# Patient Record
Sex: MALE | Race: BLACK OR AFRICAN AMERICAN | NOT HISPANIC OR LATINO | Employment: UNEMPLOYED | URBAN - METROPOLITAN AREA
[De-identification: names, ages, dates, MRNs, and addresses within clinical notes are randomized per-mention and may not be internally consistent; named-entity substitution may affect disease eponyms.]

---

## 2023-02-22 LAB
CALCIDIOL (25 OH VITAMIN D3) (NG/ML) IN SER/PLAS: 14 NG/ML
CHOLESTEROL (MG/DL) IN SER/PLAS: 247 MG/DL (ref 0–199)
CHOLESTEROL IN HDL (MG/DL) IN SER/PLAS: 53.3 MG/DL
CHOLESTEROL/HDL RATIO: 4.6
LDL: 174 MG/DL (ref 0–99)
THYROTROPIN (MIU/L) IN SER/PLAS BY DETECTION LIMIT <= 0.05 MIU/L: 0.43 MIU/L (ref 0.44–3.98)
THYROXINE (T4) FREE (NG/DL) IN SER/PLAS: 1.14 NG/DL (ref 0.78–1.48)
TRIGLYCERIDE (MG/DL) IN SER/PLAS: 98 MG/DL (ref 0–149)
VLDL: 20 MG/DL (ref 0–40)

## 2023-03-14 DIAGNOSIS — E05.90 SUBCLINICAL HYPERTHYROIDISM: Primary | ICD-10-CM

## 2023-03-14 LAB
THYROTROPIN (MIU/L) IN SER/PLAS BY DETECTION LIMIT <= 0.05 MIU/L: 0.34 MIU/L (ref 0.44–3.98)
THYROXINE (T4) FREE (NG/DL) IN SER/PLAS: 1.17 NG/DL (ref 0.78–1.48)

## 2023-03-14 NOTE — RESULT ENCOUNTER NOTE
Continuing to show subclinical hyperthyroidism.  I am referring to endocrinology for further evaluation and treatment.

## 2023-03-15 ENCOUNTER — TELEPHONE (OUTPATIENT)
Dept: PRIMARY CARE | Facility: CLINIC | Age: 68
End: 2023-03-15
Payer: COMMERCIAL

## 2023-03-15 NOTE — TELEPHONE ENCOUNTER
----- Message from Christopher D'Amico, DO sent at 3/10/2023  8:46 AM EST -----  CT chest shows stable nodules that are very small, considered to be likely benign.  It is recommended that he continues with annual screening with chest CT.    Findings of diffuse idiopathic skeletal hyperostosis (DISH).  This is hardening of the ligaments around the spine.  Can cause pain and stiffness.  Treatments include physical therapy, and possibly surgery based on the symptoms.  If asymptomatic, we will continue to monitor, if feeling stiff, we start with physical therapy.  And if significant pain, we can refer to spinal surgery.

## 2023-04-07 ENCOUNTER — HOSPITAL ENCOUNTER (OUTPATIENT)
Dept: DATA CONVERSION | Facility: HOSPITAL | Age: 68
End: 2023-04-07
Attending: PAIN MEDICINE | Admitting: PAIN MEDICINE
Payer: COMMERCIAL

## 2023-04-07 DIAGNOSIS — M54.16 RADICULOPATHY, LUMBAR REGION: ICD-10-CM

## 2023-04-07 DIAGNOSIS — Z91.041 RADIOGRAPHIC DYE ALLERGY STATUS: ICD-10-CM

## 2023-04-10 ENCOUNTER — APPOINTMENT (OUTPATIENT)
Dept: PRIMARY CARE | Facility: CLINIC | Age: 68
End: 2023-04-10
Payer: COMMERCIAL

## 2023-04-13 ENCOUNTER — APPOINTMENT (OUTPATIENT)
Dept: PRIMARY CARE | Facility: CLINIC | Age: 68
End: 2023-04-13
Payer: COMMERCIAL

## 2023-05-12 ENCOUNTER — OFFICE VISIT (OUTPATIENT)
Dept: PRIMARY CARE | Facility: CLINIC | Age: 68
End: 2023-05-12
Payer: COMMERCIAL

## 2023-05-12 ENCOUNTER — LAB (OUTPATIENT)
Dept: LAB | Facility: LAB | Age: 68
End: 2023-05-12
Payer: COMMERCIAL

## 2023-05-12 VITALS
HEART RATE: 85 BPM | WEIGHT: 261 LBS | DIASTOLIC BLOOD PRESSURE: 72 MMHG | TEMPERATURE: 98.6 F | OXYGEN SATURATION: 96 % | HEIGHT: 67 IN | BODY MASS INDEX: 40.97 KG/M2 | SYSTOLIC BLOOD PRESSURE: 124 MMHG

## 2023-05-12 DIAGNOSIS — M10.9 GOUT, UNSPECIFIED CAUSE, UNSPECIFIED CHRONICITY, UNSPECIFIED SITE: ICD-10-CM

## 2023-05-12 DIAGNOSIS — I86.1 VARICOCELE PRESENT ON ULTRASOUND OF SCROTUM: ICD-10-CM

## 2023-05-12 DIAGNOSIS — C61 PROSTATE CANCER (MULTI): ICD-10-CM

## 2023-05-12 DIAGNOSIS — I10 HYPERTENSION, UNSPECIFIED TYPE: ICD-10-CM

## 2023-05-12 DIAGNOSIS — E55.9 VITAMIN D DEFICIENCY: ICD-10-CM

## 2023-05-12 DIAGNOSIS — E78.5 HYPERLIPIDEMIA, UNSPECIFIED HYPERLIPIDEMIA TYPE: ICD-10-CM

## 2023-05-12 DIAGNOSIS — R79.89 ELEVATED TSH: ICD-10-CM

## 2023-05-12 DIAGNOSIS — I10 HYPERTENSION, UNSPECIFIED TYPE: Primary | ICD-10-CM

## 2023-05-12 PROBLEM — R29.898 LEFT LEG WEAKNESS: Status: ACTIVE | Noted: 2023-05-12

## 2023-05-12 PROBLEM — G89.29 CHRONIC LOW BACK PAIN WITHOUT SCIATICA: Status: ACTIVE | Noted: 2023-05-12

## 2023-05-12 PROBLEM — Z85.528 HISTORY OF RENAL CELL CARCINOMA: Status: ACTIVE | Noted: 2023-05-12

## 2023-05-12 PROBLEM — M54.16 LUMBAR RADICULOPATHY: Status: ACTIVE | Noted: 2023-05-12

## 2023-05-12 PROBLEM — R29.2 HYPER REFLEXIA: Status: ACTIVE | Noted: 2023-05-12

## 2023-05-12 PROBLEM — N39.41 URGE INCONTINENCE OF URINE: Status: ACTIVE | Noted: 2023-05-12

## 2023-05-12 PROBLEM — M54.50 CHRONIC LOW BACK PAIN WITHOUT SCIATICA: Status: ACTIVE | Noted: 2023-05-12

## 2023-05-12 PROBLEM — M25.552 LEFT HIP PAIN: Status: ACTIVE | Noted: 2023-05-12

## 2023-05-12 PROBLEM — G47.33 OBSTRUCTIVE SLEEP APNEA OF ADULT: Status: ACTIVE | Noted: 2023-05-12

## 2023-05-12 PROBLEM — R94.31 ABNORMAL EKG: Status: ACTIVE | Noted: 2023-05-12

## 2023-05-12 PROBLEM — M67.431 GANGLION OF RIGHT WRIST: Status: ACTIVE | Noted: 2023-05-12

## 2023-05-12 PROBLEM — R35.0 INCREASED FREQUENCY OF URINATION: Status: ACTIVE | Noted: 2023-05-12

## 2023-05-12 PROBLEM — N45.1 EPIDIDYMITIS: Status: ACTIVE | Noted: 2023-05-12

## 2023-05-12 PROBLEM — Z97.3 WEARS READING EYEGLASSES: Status: ACTIVE | Noted: 2023-05-12

## 2023-05-12 PROBLEM — M25.562 LEFT KNEE PAIN: Status: ACTIVE | Noted: 2023-05-12

## 2023-05-12 PROBLEM — Z90.79 S/P PROSTATECTOMY: Status: ACTIVE | Noted: 2023-05-12

## 2023-05-12 PROBLEM — K63.5 COLON POLYP: Status: ACTIVE | Noted: 2023-05-12

## 2023-05-12 PROBLEM — H40.003 GLAUCOMA SUSPECT OF BOTH EYES: Status: ACTIVE | Noted: 2023-05-12

## 2023-05-12 PROBLEM — M25.561 ARTHRALGIA OF RIGHT KNEE: Status: ACTIVE | Noted: 2023-05-12

## 2023-05-12 PROBLEM — N52.9 MALE ERECTILE DISORDER OF ORGANIC ORIGIN: Status: ACTIVE | Noted: 2023-05-12

## 2023-05-12 PROBLEM — S83.241A TEAR OF MEDIAL MENISCUS OF RIGHT KNEE: Status: ACTIVE | Noted: 2023-05-12

## 2023-05-12 PROBLEM — N50.819 PERSISTENT TESTICULAR PAIN: Status: ACTIVE | Noted: 2023-05-12

## 2023-05-12 PROBLEM — N50.89 PAIN OF MALE GENITALIA: Status: ACTIVE | Noted: 2023-05-12

## 2023-05-12 PROBLEM — R97.20 ELEVATED PSA: Status: ACTIVE | Noted: 2023-05-12

## 2023-05-12 PROBLEM — N52.31 ERECTILE DYSFUNCTION FOLLOWING RADICAL PROSTATECTOMY: Status: ACTIVE | Noted: 2023-05-12

## 2023-05-12 PROBLEM — H26.9 CATARACT: Status: ACTIVE | Noted: 2023-05-12

## 2023-05-12 PROBLEM — K86.2 CYST OF PANCREAS (HHS-HCC): Status: ACTIVE | Noted: 2023-05-12

## 2023-05-12 PROBLEM — N45.3 ORCHITIS AND EPIDIDYMITIS: Status: ACTIVE | Noted: 2023-05-12

## 2023-05-12 PROBLEM — M48.062 SPINAL STENOSIS, LUMBAR REGION, WITH NEUROGENIC CLAUDICATION: Status: ACTIVE | Noted: 2023-05-12

## 2023-05-12 LAB
ALANINE AMINOTRANSFERASE (SGPT) (U/L) IN SER/PLAS: 21 U/L (ref 10–52)
ALBUMIN (G/DL) IN SER/PLAS: 4.3 G/DL (ref 3.4–5)
ALKALINE PHOSPHATASE (U/L) IN SER/PLAS: 86 U/L (ref 33–136)
ANION GAP IN SER/PLAS: 12 MMOL/L (ref 10–20)
ASPARTATE AMINOTRANSFERASE (SGOT) (U/L) IN SER/PLAS: 16 U/L (ref 9–39)
BILIRUBIN TOTAL (MG/DL) IN SER/PLAS: 1 MG/DL (ref 0–1.2)
CALCIDIOL (25 OH VITAMIN D3) (NG/ML) IN SER/PLAS: 37 NG/ML
CALCIUM (MG/DL) IN SER/PLAS: 9.6 MG/DL (ref 8.6–10.6)
CARBON DIOXIDE, TOTAL (MMOL/L) IN SER/PLAS: 28 MMOL/L (ref 21–32)
CHLORIDE (MMOL/L) IN SER/PLAS: 105 MMOL/L (ref 98–107)
CREATININE (MG/DL) IN SER/PLAS: 1.46 MG/DL (ref 0.5–1.3)
GFR MALE: 52 ML/MIN/1.73M2
GLUCOSE (MG/DL) IN SER/PLAS: 82 MG/DL (ref 74–99)
POTASSIUM (MMOL/L) IN SER/PLAS: 4.2 MMOL/L (ref 3.5–5.3)
PROTEIN TOTAL: 6.6 G/DL (ref 6.4–8.2)
SODIUM (MMOL/L) IN SER/PLAS: 141 MMOL/L (ref 136–145)
THYROTROPIN (MIU/L) IN SER/PLAS BY DETECTION LIMIT <= 0.05 MIU/L: 0.77 MIU/L (ref 0.44–3.98)
URATE (MG/DL) IN SER/PLAS: 8.4 MG/DL (ref 4–7.5)
UREA NITROGEN (MG/DL) IN SER/PLAS: 18 MG/DL (ref 6–23)

## 2023-05-12 PROCEDURE — 84443 ASSAY THYROID STIM HORMONE: CPT

## 2023-05-12 PROCEDURE — 1159F MED LIST DOCD IN RCRD: CPT | Performed by: STUDENT IN AN ORGANIZED HEALTH CARE EDUCATION/TRAINING PROGRAM

## 2023-05-12 PROCEDURE — 82306 VITAMIN D 25 HYDROXY: CPT

## 2023-05-12 PROCEDURE — 3078F DIAST BP <80 MM HG: CPT | Performed by: STUDENT IN AN ORGANIZED HEALTH CARE EDUCATION/TRAINING PROGRAM

## 2023-05-12 PROCEDURE — 1160F RVW MEDS BY RX/DR IN RCRD: CPT | Performed by: STUDENT IN AN ORGANIZED HEALTH CARE EDUCATION/TRAINING PROGRAM

## 2023-05-12 PROCEDURE — 80053 COMPREHEN METABOLIC PANEL: CPT

## 2023-05-12 PROCEDURE — 99214 OFFICE O/P EST MOD 30 MIN: CPT | Performed by: STUDENT IN AN ORGANIZED HEALTH CARE EDUCATION/TRAINING PROGRAM

## 2023-05-12 PROCEDURE — 84550 ASSAY OF BLOOD/URIC ACID: CPT

## 2023-05-12 PROCEDURE — 3074F SYST BP LT 130 MM HG: CPT | Performed by: STUDENT IN AN ORGANIZED HEALTH CARE EDUCATION/TRAINING PROGRAM

## 2023-05-12 PROCEDURE — 36415 COLL VENOUS BLD VENIPUNCTURE: CPT

## 2023-05-12 RX ORDER — COLCHICINE 0.6 MG/1
1 TABLET ORAL 2 TIMES DAILY
COMMUNITY
Start: 2023-02-24 | End: 2023-08-22 | Stop reason: ALTCHOICE

## 2023-05-12 RX ORDER — PREDNISONE 20 MG/1
1 TABLET ORAL DAILY
COMMUNITY
Start: 2023-02-23 | End: 2023-10-11 | Stop reason: WASHOUT

## 2023-05-12 RX ORDER — MIRABEGRON 50 MG/1
1 TABLET, FILM COATED, EXTENDED RELEASE ORAL DAILY
COMMUNITY
Start: 2023-02-24 | End: 2023-05-31 | Stop reason: SDUPTHER

## 2023-05-12 RX ORDER — ACETAMINOPHEN AND CODEINE PHOSPHATE 300; 30 MG/1; MG/1
1 TABLET ORAL EVERY 8 HOURS PRN
COMMUNITY
Start: 2023-02-22 | End: 2023-08-22 | Stop reason: ALTCHOICE

## 2023-05-12 RX ORDER — SIMETHICONE 80 MG
TABLET,CHEWABLE ORAL
COMMUNITY
Start: 2021-01-27 | End: 2023-08-22 | Stop reason: ALTCHOICE

## 2023-05-12 RX ORDER — INDOMETHACIN 50 MG/1
CAPSULE ORAL
COMMUNITY
Start: 2023-02-23 | End: 2023-05-12 | Stop reason: SDUPTHER

## 2023-05-12 RX ORDER — ASPIRIN 81 MG/1
1 TABLET ORAL DAILY
COMMUNITY

## 2023-05-12 RX ORDER — ASPIRIN 325 MG
50000 TABLET, DELAYED RELEASE (ENTERIC COATED) ORAL
COMMUNITY
Start: 2023-02-24 | End: 2023-08-22 | Stop reason: SDUPTHER

## 2023-05-12 RX ORDER — METHOCARBAMOL 500 MG/1
TABLET, FILM COATED ORAL 2 TIMES DAILY
COMMUNITY
Start: 2020-09-21 | End: 2023-08-22 | Stop reason: ALTCHOICE

## 2023-05-12 RX ORDER — INDOMETHACIN 50 MG/1
CAPSULE ORAL
Qty: 30 CAPSULE | Refills: 2 | Status: SHIPPED | OUTPATIENT
Start: 2023-05-12 | End: 2023-08-22 | Stop reason: ALTCHOICE

## 2023-05-12 RX ORDER — ATORVASTATIN CALCIUM 40 MG/1
40 TABLET, FILM COATED ORAL DAILY
COMMUNITY
End: 2024-04-08 | Stop reason: SDUPTHER

## 2023-05-12 RX ORDER — NAPROXEN 500 MG/1
1 TABLET ORAL 2 TIMES DAILY
COMMUNITY
Start: 2023-01-06 | End: 2023-05-12 | Stop reason: ALTCHOICE

## 2023-05-12 ASSESSMENT — PATIENT HEALTH QUESTIONNAIRE - PHQ9
1. LITTLE INTEREST OR PLEASURE IN DOING THINGS: NOT AT ALL
SUM OF ALL RESPONSES TO PHQ9 QUESTIONS 1 AND 2: 0
2. FEELING DOWN, DEPRESSED OR HOPELESS: NOT AT ALL

## 2023-05-12 ASSESSMENT — COLUMBIA-SUICIDE SEVERITY RATING SCALE - C-SSRS
2. HAVE YOU ACTUALLY HAD ANY THOUGHTS OF KILLING YOURSELF?: NO
1. IN THE PAST MONTH, HAVE YOU WISHED YOU WERE DEAD OR WISHED YOU COULD GO TO SLEEP AND NOT WAKE UP?: NO
6. HAVE YOU EVER DONE ANYTHING, STARTED TO DO ANYTHING, OR PREPARED TO DO ANYTHING TO END YOUR LIFE?: NO

## 2023-05-12 ASSESSMENT — ENCOUNTER SYMPTOMS
OCCASIONAL FEELINGS OF UNSTEADINESS: 0
LOSS OF SENSATION IN FEET: 0

## 2023-05-12 NOTE — PROGRESS NOTES
67-year-old male presenting for follow-up of multiple concerns:    HTN  Stable, not taking any medications currently.    HLD  Stable, tolerating current regimen well.    Prostate cancer, varicocele  Was seeing urology, discussed varicocele treatments.  Patient not sure what he wants to do.    Vitamin D deficiency  Has been taking the 50,000 units weekly, but missed read and has been taking them daily.    Back pain  Improved from last visit, has established with pain management.    Gout  Having active flare, needs refills on indomethacin.    12 point ROS reviewed and negative other than as stated in HPI    General: Alert, oriented, pleasant, in no acute distress  HEENT:      Head: normocephalic, atraumatic;      eyes: EOMI, no scleral icterus;   CV: Heart with regular rate and rhythm, normal S1/S2, no murmurs  Lungs: CTAB without wheezing, rhonchi or rales; good respiratory effort, no increased work of breathing  Extremities: no edema, no cyanosis  Neuro: Cranial nerves grossly intact; alert and oriented  Psych: Appropriate mood and affect    1.  HTN  - At goal without medication    2.  HLD  - Continue atorvastatin 40 mg daily  - Repeat lipid panel    3.  Prostate cancer 4.  Varicocele of testicle  - Given urologist information to schedule follow-up    5.  Back pain  - Significant improvement since last visit  - Continue to follow with pain management    6.  Vitamin D deficiency  - Repeat vitamin D, patient was taking 50,000 units daily by mistake    7.  Gout  - Refill indomethacin  - Uric acid added to labs    8.  Elevated TSH on last lab  - Repeat TSH  Follow-up as scheduled    Christopher D'Amico, DO

## 2023-05-15 ENCOUNTER — DOCUMENTATION (OUTPATIENT)
Dept: PRIMARY CARE | Facility: CLINIC | Age: 68
End: 2023-05-15
Payer: COMMERCIAL

## 2023-05-15 NOTE — RESULT ENCOUNTER NOTE
Slight decrease in kidney function, unclear etiology.  Continue to avoid nephrotoxic medication, should not be using indomethacin or naproxen or colchicine routinely for gout, next flare will consider short steroid burst.    Uric acid slightly above goal.  Will consider allopurinol as a daily preventative medication in the future, if continuing to have flares.    Remaining labs unremarkable

## 2023-05-18 ENCOUNTER — TELEPHONE (OUTPATIENT)
Dept: PRIMARY CARE | Facility: CLINIC | Age: 68
End: 2023-05-18
Payer: COMMERCIAL

## 2023-05-18 NOTE — TELEPHONE ENCOUNTER
----- Message from Christopher D'Amico, DO sent at 5/15/2023  8:59 AM EDT -----  Slight decrease in kidney function, unclear etiology.  Continue to avoid nephrotoxic medication, should not be using indomethacin or naproxen or colchicine routinely for gout, next flare will consider short steroid burst.    Uric acid slightly above goal.  Will consider allopurinol as a daily preventative medication in the future, if continuing to have flares.    Remaining labs unremarkable

## 2023-05-31 DIAGNOSIS — N39.41 URGE INCONTINENCE OF URINE: Primary | ICD-10-CM

## 2023-05-31 RX ORDER — MIRABEGRON 50 MG/1
50 TABLET, FILM COATED, EXTENDED RELEASE ORAL DAILY
Qty: 90 TABLET | Refills: 3 | Status: SHIPPED | OUTPATIENT
Start: 2023-05-31

## 2023-08-14 LAB — PROSTATE SPECIFIC AG (NG/ML) IN SER/PLAS: <0.1 NG/ML (ref 0–4)

## 2023-08-22 ENCOUNTER — OFFICE VISIT (OUTPATIENT)
Dept: PRIMARY CARE | Facility: CLINIC | Age: 68
End: 2023-08-22
Payer: COMMERCIAL

## 2023-08-22 VITALS
DIASTOLIC BLOOD PRESSURE: 84 MMHG | SYSTOLIC BLOOD PRESSURE: 134 MMHG | WEIGHT: 259 LBS | OXYGEN SATURATION: 95 % | HEART RATE: 85 BPM | HEIGHT: 67 IN | BODY MASS INDEX: 40.65 KG/M2

## 2023-08-22 DIAGNOSIS — E55.9 VITAMIN D DEFICIENCY: ICD-10-CM

## 2023-08-22 DIAGNOSIS — M79.671 RIGHT FOOT PAIN: Primary | ICD-10-CM

## 2023-08-22 PROBLEM — F17.210 NICOTINE DEPENDENCE, CIGARETTES, UNCOMPLICATED: Status: ACTIVE | Noted: 2023-08-22

## 2023-08-22 PROBLEM — M10.349: Status: ACTIVE | Noted: 2023-08-22

## 2023-08-22 PROBLEM — E05.90 SUBCLINICAL HYPERTHYROIDISM: Status: ACTIVE | Noted: 2023-08-22

## 2023-08-22 PROCEDURE — 99214 OFFICE O/P EST MOD 30 MIN: CPT | Performed by: STUDENT IN AN ORGANIZED HEALTH CARE EDUCATION/TRAINING PROGRAM

## 2023-08-22 PROCEDURE — 1125F AMNT PAIN NOTED PAIN PRSNT: CPT | Performed by: STUDENT IN AN ORGANIZED HEALTH CARE EDUCATION/TRAINING PROGRAM

## 2023-08-22 PROCEDURE — 3075F SYST BP GE 130 - 139MM HG: CPT | Performed by: STUDENT IN AN ORGANIZED HEALTH CARE EDUCATION/TRAINING PROGRAM

## 2023-08-22 PROCEDURE — 1160F RVW MEDS BY RX/DR IN RCRD: CPT | Performed by: STUDENT IN AN ORGANIZED HEALTH CARE EDUCATION/TRAINING PROGRAM

## 2023-08-22 PROCEDURE — 3079F DIAST BP 80-89 MM HG: CPT | Performed by: STUDENT IN AN ORGANIZED HEALTH CARE EDUCATION/TRAINING PROGRAM

## 2023-08-22 PROCEDURE — 1159F MED LIST DOCD IN RCRD: CPT | Performed by: STUDENT IN AN ORGANIZED HEALTH CARE EDUCATION/TRAINING PROGRAM

## 2023-08-22 RX ORDER — ASPIRIN 325 MG
50000 TABLET, DELAYED RELEASE (ENTERIC COATED) ORAL
Qty: 12 CAPSULE | Refills: 1 | Status: SHIPPED | OUTPATIENT
Start: 2023-08-22 | End: 2024-02-20

## 2023-08-22 RX ORDER — PREDNISONE 20 MG/1
40 TABLET ORAL DAILY
Qty: 10 TABLET | Refills: 0 | Status: SHIPPED | OUTPATIENT
Start: 2023-08-22 | End: 2023-08-27

## 2023-08-22 ASSESSMENT — COLUMBIA-SUICIDE SEVERITY RATING SCALE - C-SSRS
6. HAVE YOU EVER DONE ANYTHING, STARTED TO DO ANYTHING, OR PREPARED TO DO ANYTHING TO END YOUR LIFE?: NO
2. HAVE YOU ACTUALLY HAD ANY THOUGHTS OF KILLING YOURSELF?: NO
1. IN THE PAST MONTH, HAVE YOU WISHED YOU WERE DEAD OR WISHED YOU COULD GO TO SLEEP AND NOT WAKE UP?: NO

## 2023-08-22 ASSESSMENT — PATIENT HEALTH QUESTIONNAIRE - PHQ9
1. LITTLE INTEREST OR PLEASURE IN DOING THINGS: NOT AT ALL
2. FEELING DOWN, DEPRESSED OR HOPELESS: NOT AT ALL
SUM OF ALL RESPONSES TO PHQ9 QUESTIONS 1 AND 2: 0

## 2023-08-22 ASSESSMENT — ENCOUNTER SYMPTOMS
LOSS OF SENSATION IN FEET: 0
OCCASIONAL FEELINGS OF UNSTEADINESS: 0
DEPRESSION: 0

## 2023-08-22 NOTE — PROGRESS NOTES
68-year-old male presenting for right foot pain.    Was told he has gout in the past, the pain is mostly at the great toe at the MTP joint.  Not significantly tender to touch.  Reports he is getting flares frequently.    CKD  Patient unaware of this diagnosis, was told at last appointment to no longer take colchicine and naproxen, but he seems to be unaware of that.    12 point ROS reviewed and negative other than as stated in HPI    General: Alert, oriented, pleasant, in no acute distress  HEENT:      Head: normocephalic, atraumatic;      eyes: EOMI, no scleral icterus;   CV: Heart with regular rate and rhythm, normal S1/S2, no murmurs  Lungs: CTAB without wheezing, rhonchi or rales; good respiratory effort, no increased work of breathing  Abdomen: soft, non-tender, non-distended, no masses appreciated  Extremities: no edema, no cyanosis, mild tenderness to palpation of the right great toe; flat feet  Neuro: Cranial nerves grossly intact; alert and oriented  Psych: Appropriate mood and affect    1.  Right foot pain 2.  Pes planus 3.  Onychomycosis  - Possibly gout, did have a increased uric acid on last lab draw  - We will give 5 days of prednisone 40 mg daily, x-ray  - We will refer to podiatry due to multiple foot issues  - Would consider allopurinol therapy, would not like to put him on long-term NSAID prophylaxis, will wait for podiatry recommendations     4.  Vitamin D deficiency  - Refill    Follow-up 6 months or sooner if indicated    Christopher D'Amico, DO

## 2023-08-24 NOTE — RESULT ENCOUNTER NOTE
X-ray of foot does show arthritis in the big toe, this is likely why he is having continued pain there.  Likely secondary contributions from flatfeet.  I would recommend continuing with podiatry as discussed in office.

## 2023-09-05 PROBLEM — E66.01 CLASS 3 SEVERE OBESITY DUE TO EXCESS CALORIES WITHOUT SERIOUS COMORBIDITY WITH BODY MASS INDEX (BMI) OF 40.0 TO 44.9 IN ADULT (MULTI): Status: ACTIVE | Noted: 2023-09-05

## 2023-09-05 PROBLEM — E66.813 CLASS 3 SEVERE OBESITY DUE TO EXCESS CALORIES WITHOUT SERIOUS COMORBIDITY WITH BODY MASS INDEX (BMI) OF 40.0 TO 44.9 IN ADULT: Status: ACTIVE | Noted: 2023-09-05

## 2023-09-05 RX ORDER — ERGOCALCIFEROL 1.25 MG/1
50000 CAPSULE ORAL
COMMUNITY
End: 2023-11-08 | Stop reason: SDUPTHER

## 2023-09-05 RX ORDER — INDOMETHACIN 50 MG/1
1-3 CAPSULE ORAL DAILY PRN
COMMUNITY

## 2023-09-05 RX ORDER — SIMETHICONE 80 MG
80 TABLET,CHEWABLE ORAL 3 TIMES DAILY PRN
COMMUNITY
End: 2024-03-07 | Stop reason: ALTCHOICE

## 2023-09-06 VITALS
RESPIRATION RATE: 21 BRPM | SYSTOLIC BLOOD PRESSURE: 129 MMHG | BODY MASS INDEX: 41.43 KG/M2 | DIASTOLIC BLOOD PRESSURE: 68 MMHG | HEART RATE: 83 BPM | WEIGHT: 264.55 LBS | TEMPERATURE: 97.2 F

## 2023-10-11 ENCOUNTER — OFFICE VISIT (OUTPATIENT)
Dept: ENDOCRINOLOGY | Facility: CLINIC | Age: 68
End: 2023-10-11
Payer: COMMERCIAL

## 2023-10-11 VITALS
HEIGHT: 65 IN | HEART RATE: 78 BPM | SYSTOLIC BLOOD PRESSURE: 149 MMHG | DIASTOLIC BLOOD PRESSURE: 83 MMHG | BODY MASS INDEX: 43.99 KG/M2 | WEIGHT: 264 LBS

## 2023-10-11 DIAGNOSIS — E05.90 SUBCLINICAL HYPERTHYROIDISM: Primary | ICD-10-CM

## 2023-10-11 PROCEDURE — 1159F MED LIST DOCD IN RCRD: CPT | Performed by: STUDENT IN AN ORGANIZED HEALTH CARE EDUCATION/TRAINING PROGRAM

## 2023-10-11 PROCEDURE — 99203 OFFICE O/P NEW LOW 30 MIN: CPT | Performed by: STUDENT IN AN ORGANIZED HEALTH CARE EDUCATION/TRAINING PROGRAM

## 2023-10-11 PROCEDURE — 3079F DIAST BP 80-89 MM HG: CPT | Performed by: STUDENT IN AN ORGANIZED HEALTH CARE EDUCATION/TRAINING PROGRAM

## 2023-10-11 PROCEDURE — 1126F AMNT PAIN NOTED NONE PRSNT: CPT | Performed by: STUDENT IN AN ORGANIZED HEALTH CARE EDUCATION/TRAINING PROGRAM

## 2023-10-11 PROCEDURE — 1160F RVW MEDS BY RX/DR IN RCRD: CPT | Performed by: STUDENT IN AN ORGANIZED HEALTH CARE EDUCATION/TRAINING PROGRAM

## 2023-10-11 PROCEDURE — 3077F SYST BP >= 140 MM HG: CPT | Performed by: STUDENT IN AN ORGANIZED HEALTH CARE EDUCATION/TRAINING PROGRAM

## 2023-10-11 ASSESSMENT — PAIN SCALES - GENERAL: PAINLEVEL: 0-NO PAIN

## 2023-10-11 NOTE — PROGRESS NOTES
68 M PMH: HTN, HLD, Obesity, elvated PSA, BPH, RCC, ETHAN, spinal stenosis, CKD    Coming in today for thyroid evaluation, referred by PCP.  History obtained from the chart, patient is a poor historian    Lab trends showing mildly low TSH from .3-.4 since feb of this year with normal FT4.  Most recent TSH in My was normal   No prodrome of illness   Does not recall being on amiodarone   Had prednisone rx in the system but he is not sure if he was ever on this     hx of radiation   MARCUS-none   iodine/contrast exposure -none  thyroid ultrasound -none in the past    Past Medical History:   Diagnosis Date    Encounter for preprocedural cardiovascular examination 06/13/2013    Pre-operative cardiovascular examination    Male erectile dysfunction, unspecified 08/13/2020    Organic impotence    Osteoarthritis of hip, unspecified 06/13/2013    Osteoarthritis of hip    Other cervical disc degeneration, unspecified cervical region 06/13/2013    Degeneration of cervical intervertebral disc    Other conditions influencing health status 06/13/2013    Shoulder Joint Disorder    Other conditions influencing health status 06/13/2013    Difficulty Breathing (Dyspnea)    Other specified soft tissue disorders 06/13/2013    Limb swelling    Personal history of other diseases of the musculoskeletal system and connective tissue 08/13/2020    History of backache    Personal history of other diseases of the nervous system and sense organs     History of cataract    Personal history of other malignant neoplasm of kidney 04/15/2021    History of other malignant neoplasm of kidney    Personal history of other specified conditions 06/13/2013    History of headache    Spondylosis without myelopathy or radiculopathy, cervical region 06/13/2013    Cervical spondylosis      Social History     Socioeconomic History    Marital status:      Spouse name: Not on file    Number of children: Not on file    Years of education: Not on file    Highest  education level: Not on file   Occupational History    Not on file   Tobacco Use    Smoking status: Every Day     Packs/day: 1     Types: Cigarettes    Smokeless tobacco: Never   Vaping Use    Vaping Use: Never used   Substance and Sexual Activity    Alcohol use: Yes     Comment: now and then (holidays)    Drug use: Never    Sexual activity: Not on file   Other Topics Concern    Not on file   Social History Narrative    Not on file     Social Determinants of Health     Financial Resource Strain: Not on file   Food Insecurity: Not on file   Transportation Needs: Not on file   Physical Activity: Not on file   Stress: Not on file   Social Connections: Not on file   Intimate Partner Violence: Not on file   Housing Stability: Not on file      Family History   Problem Relation Name Age of Onset    No Known Problems Mother      No Known Problems Father      Famhx-no known thyroid cancer   Social- current smoker     No diarrhea  No heat intolerance   No palpitations   ROS reviewed and is negative except for pertinent findings noted on HPI    Physical Exam  Constitutional:       Appearance: Normal appearance.   HENT:      Head: Normocephalic.   Neck:      Comments: No goiter, unable to palpate any nodules   Cardiovascular:      Rate and Rhythm: Normal rate and regular rhythm.   Abdominal:      Comments: Abdominal obesity, soft non tender   Musculoskeletal:      Comments: Walks with a cane   Skin:     General: Skin is warm.   Neurological:      General: No focal deficit present.      Mental Status: He is alert and oriented to person, place, and time.      Comments: No delay in relaxation of DTR    Psychiatric:         Mood and Affect: Mood normal.         labs and imaging reviewed, pertinent findings listed on HPI and Impression      Problem List Items Addressed This Visit       Subclinical hyperthyroidism - Primary    Relevant Orders    Thyroid Stimulating Immunoglobulin    Thyroxine, Free    Thyrotropin Receptor Antibody     Triiodothyronine, Total    US thyroid    Thyroid Stimulating Hormone    Hepatic Function Panel      Abnormal TFTs    Could be from thyroiditis, subclinical Graves, or prednisone effect.  He is currently euthyroid on exam    He is currently off steroids    -check TFTs with antibodies   -thyroid ultrasound  -if with subclinical graves or toxic nodule, then will need treatment due to age    Follow up as needed    Time spent  Coordination of care and Plan communicated to PCP electronically via EMR  Total time spent 40 min in this encounter including chart review, coordination of care, history physical exam, counseling and placing lab orders

## 2023-10-11 NOTE — PATIENT INSTRUCTIONS
-Get your blood work done, it does not need to be fasting  -schedule to get your thyroid ultrasound done     If you need further work up, I will let you know    Sandra Friedman MD  Divison of Endocrinology   [facility]   Phone: [phone]    option 4, then option 1  Fax: [phone]

## 2023-10-12 ENCOUNTER — LAB (OUTPATIENT)
Dept: LAB | Facility: LAB | Age: 68
End: 2023-10-12
Payer: MEDICAID

## 2023-10-12 DIAGNOSIS — E05.90 SUBCLINICAL HYPERTHYROIDISM: ICD-10-CM

## 2023-10-12 LAB
ALBUMIN SERPL BCP-MCNC: 4.4 G/DL (ref 3.4–5)
ALP SERPL-CCNC: 82 U/L (ref 33–136)
ALT SERPL W P-5'-P-CCNC: 36 U/L (ref 10–52)
AST SERPL W P-5'-P-CCNC: 21 U/L (ref 9–39)
BILIRUB DIRECT SERPL-MCNC: 0.2 MG/DL (ref 0–0.3)
BILIRUB SERPL-MCNC: 1 MG/DL (ref 0–1.2)
PROT SERPL-MCNC: 6.7 G/DL (ref 6.4–8.2)
T3 SERPL-MCNC: 152 NG/DL (ref 60–200)
T4 FREE SERPL-MCNC: 1.15 NG/DL (ref 0.78–1.48)
TSH SERPL-ACNC: 1.12 MIU/L (ref 0.44–3.98)

## 2023-10-12 PROCEDURE — 84443 ASSAY THYROID STIM HORMONE: CPT

## 2023-10-12 PROCEDURE — 80076 HEPATIC FUNCTION PANEL: CPT

## 2023-10-12 PROCEDURE — 84439 ASSAY OF FREE THYROXINE: CPT

## 2023-10-12 PROCEDURE — 36415 COLL VENOUS BLD VENIPUNCTURE: CPT

## 2023-10-12 PROCEDURE — 84445 ASSAY OF TSI GLOBULIN: CPT

## 2023-10-12 PROCEDURE — 84480 ASSAY TRIIODOTHYRONINE (T3): CPT

## 2023-10-12 PROCEDURE — 83519 RIA NONANTIBODY: CPT

## 2023-10-14 LAB — TSH RECEP AB SER-ACNC: <0.8 IU/L

## 2023-10-19 LAB — TSI SER-ACNC: <1 TSI INDEX

## 2023-11-01 ENCOUNTER — DOCUMENTATION (OUTPATIENT)
Dept: ENDOCRINOLOGY | Facility: CLINIC | Age: 68
End: 2023-11-01
Payer: COMMERCIAL

## 2023-11-01 NOTE — PROGRESS NOTES
Labs reviewed  Thyroid function reverted back to normal with negative thyroid antibodies.  No further testing needed.  Can ff up with pcp

## 2023-11-08 DIAGNOSIS — M10.9 GOUT OF HAND, UNSPECIFIED CAUSE, UNSPECIFIED CHRONICITY, UNSPECIFIED LATERALITY: ICD-10-CM

## 2023-11-08 DIAGNOSIS — E55.9 VITAMIN D DEFICIENCY: ICD-10-CM

## 2023-11-08 RX ORDER — ERGOCALCIFEROL 1.25 MG/1
50000 CAPSULE ORAL
Qty: 12 CAPSULE | Refills: 1 | Status: SHIPPED | OUTPATIENT
Start: 2023-11-08 | End: 2024-03-07 | Stop reason: ALTCHOICE

## 2023-11-08 RX ORDER — COLCHICINE 0.6 MG/1
0.6 TABLET ORAL DAILY
COMMUNITY
End: 2023-11-08 | Stop reason: SDUPTHER

## 2023-11-08 RX ORDER — COLCHICINE 0.6 MG/1
0.6 TABLET ORAL DAILY
Qty: 180 TABLET | Refills: 2 | Status: SHIPPED | OUTPATIENT
Start: 2023-11-08

## 2024-02-14 ENCOUNTER — APPOINTMENT (OUTPATIENT)
Dept: UROLOGY | Facility: HOSPITAL | Age: 69
End: 2024-02-14
Payer: COMMERCIAL

## 2024-02-20 ENCOUNTER — OFFICE VISIT (OUTPATIENT)
Dept: PRIMARY CARE | Facility: CLINIC | Age: 69
End: 2024-02-20
Payer: COMMERCIAL

## 2024-02-20 VITALS
HEART RATE: 88 BPM | HEIGHT: 65 IN | OXYGEN SATURATION: 97 % | WEIGHT: 260 LBS | BODY MASS INDEX: 43.32 KG/M2 | SYSTOLIC BLOOD PRESSURE: 138 MMHG | DIASTOLIC BLOOD PRESSURE: 76 MMHG

## 2024-02-20 DIAGNOSIS — C61 PROSTATE CANCER (MULTI): ICD-10-CM

## 2024-02-20 DIAGNOSIS — F17.210 NICOTINE DEPENDENCE, CIGARETTES, UNCOMPLICATED: ICD-10-CM

## 2024-02-20 DIAGNOSIS — I10 HYPERTENSION, UNSPECIFIED TYPE: Primary | ICD-10-CM

## 2024-02-20 DIAGNOSIS — F17.200 NICOTINE USE DISORDER: ICD-10-CM

## 2024-02-20 DIAGNOSIS — H26.9 CATARACT, UNSPECIFIED CATARACT TYPE, UNSPECIFIED LATERALITY: ICD-10-CM

## 2024-02-20 DIAGNOSIS — Z12.5 SCREENING FOR PROSTATE CANCER: ICD-10-CM

## 2024-02-20 DIAGNOSIS — Z00.00 MEDICARE ANNUAL WELLNESS VISIT, SUBSEQUENT: ICD-10-CM

## 2024-02-20 DIAGNOSIS — E55.9 VITAMIN D DEFICIENCY: ICD-10-CM

## 2024-02-20 DIAGNOSIS — R79.89 ELEVATED TSH: ICD-10-CM

## 2024-02-20 DIAGNOSIS — E78.5 HYPERLIPIDEMIA, UNSPECIFIED HYPERLIPIDEMIA TYPE: ICD-10-CM

## 2024-02-20 DIAGNOSIS — I86.1 VARICOCELE PRESENT ON ULTRASOUND OF SCROTUM: ICD-10-CM

## 2024-02-20 DIAGNOSIS — Z00.00 WELLNESS EXAMINATION: ICD-10-CM

## 2024-02-20 PROCEDURE — 3008F BODY MASS INDEX DOCD: CPT | Performed by: STUDENT IN AN ORGANIZED HEALTH CARE EDUCATION/TRAINING PROGRAM

## 2024-02-20 PROCEDURE — 90677 PCV20 VACCINE IM: CPT | Performed by: STUDENT IN AN ORGANIZED HEALTH CARE EDUCATION/TRAINING PROGRAM

## 2024-02-20 PROCEDURE — 3078F DIAST BP <80 MM HG: CPT | Performed by: STUDENT IN AN ORGANIZED HEALTH CARE EDUCATION/TRAINING PROGRAM

## 2024-02-20 PROCEDURE — G0008 ADMIN INFLUENZA VIRUS VAC: HCPCS | Performed by: STUDENT IN AN ORGANIZED HEALTH CARE EDUCATION/TRAINING PROGRAM

## 2024-02-20 PROCEDURE — 1159F MED LIST DOCD IN RCRD: CPT | Performed by: STUDENT IN AN ORGANIZED HEALTH CARE EDUCATION/TRAINING PROGRAM

## 2024-02-20 PROCEDURE — 1170F FXNL STATUS ASSESSED: CPT | Performed by: STUDENT IN AN ORGANIZED HEALTH CARE EDUCATION/TRAINING PROGRAM

## 2024-02-20 PROCEDURE — 99397 PER PM REEVAL EST PAT 65+ YR: CPT | Performed by: STUDENT IN AN ORGANIZED HEALTH CARE EDUCATION/TRAINING PROGRAM

## 2024-02-20 PROCEDURE — G0009 ADMIN PNEUMOCOCCAL VACCINE: HCPCS | Performed by: STUDENT IN AN ORGANIZED HEALTH CARE EDUCATION/TRAINING PROGRAM

## 2024-02-20 PROCEDURE — 3075F SYST BP GE 130 - 139MM HG: CPT | Performed by: STUDENT IN AN ORGANIZED HEALTH CARE EDUCATION/TRAINING PROGRAM

## 2024-02-20 PROCEDURE — 1160F RVW MEDS BY RX/DR IN RCRD: CPT | Performed by: STUDENT IN AN ORGANIZED HEALTH CARE EDUCATION/TRAINING PROGRAM

## 2024-02-20 PROCEDURE — 99214 OFFICE O/P EST MOD 30 MIN: CPT | Performed by: STUDENT IN AN ORGANIZED HEALTH CARE EDUCATION/TRAINING PROGRAM

## 2024-02-20 PROCEDURE — G0439 PPPS, SUBSEQ VISIT: HCPCS | Performed by: STUDENT IN AN ORGANIZED HEALTH CARE EDUCATION/TRAINING PROGRAM

## 2024-02-20 PROCEDURE — 1126F AMNT PAIN NOTED NONE PRSNT: CPT | Performed by: STUDENT IN AN ORGANIZED HEALTH CARE EDUCATION/TRAINING PROGRAM

## 2024-02-20 PROCEDURE — 90662 IIV NO PRSV INCREASED AG IM: CPT | Performed by: STUDENT IN AN ORGANIZED HEALTH CARE EDUCATION/TRAINING PROGRAM

## 2024-02-20 RX ORDER — NEOMYCIN AND POLYMYXIN B SULFATES AND BACITRACIN ZINC 400; 3.5; 1 [USP'U]/G; MG/G; [USP'U]/G
0.5 OINTMENT OPHTHALMIC 4 TIMES DAILY
Qty: 3.5 G | Refills: 2 | Status: SHIPPED | OUTPATIENT
Start: 2024-02-20 | End: 2024-03-07 | Stop reason: ALTCHOICE

## 2024-02-20 RX ORDER — NEOMYCIN AND POLYMYXIN B SULFATES AND BACITRACIN ZINC 400; 3.5; 1 [USP'U]/G; MG/G; [USP'U]/G
0.5 OINTMENT OPHTHALMIC 4 TIMES DAILY
COMMUNITY
End: 2024-02-20 | Stop reason: SDUPTHER

## 2024-02-20 ASSESSMENT — ACTIVITIES OF DAILY LIVING (ADL)
TAKING_MEDICATION: INDEPENDENT
MANAGING_FINANCES: INDEPENDENT
DOING_HOUSEWORK: INDEPENDENT
BATHING: INDEPENDENT
DRESSING: INDEPENDENT
GROCERY_SHOPPING: INDEPENDENT

## 2024-02-20 ASSESSMENT — ENCOUNTER SYMPTOMS
OCCASIONAL FEELINGS OF UNSTEADINESS: 0
DEPRESSION: 0
LOSS OF SENSATION IN FEET: 0

## 2024-02-20 ASSESSMENT — PATIENT HEALTH QUESTIONNAIRE - PHQ9
SUM OF ALL RESPONSES TO PHQ9 QUESTIONS 1 AND 2: 0
1. LITTLE INTEREST OR PLEASURE IN DOING THINGS: NOT AT ALL
2. FEELING DOWN, DEPRESSED OR HOPELESS: NOT AT ALL

## 2024-02-20 NOTE — PATIENT INSTRUCTIONS
Lesley at pharmacy   Detail Level: Zone Additional Notes: $50 off per official special and pt has $70 bd coupon

## 2024-02-20 NOTE — PROGRESS NOTES
"Subjective   Reason for Visit: Dinesh Weiss is an 68 y.o. male here for a Medicare Wellness visit.          Reviewed all medications by prescribing practitioner or clinical pharmacist (such as prescriptions, OTCs, herbal therapies and supplements) and documented in the medical record.    HPI    Patient Care Team:  Christopher D'Amico, DO as PCP - General     Review of Systems    Objective   Vitals:  /78   Pulse 88   Ht 1.651 m (5' 5\")   Wt 118 kg (260 lb)   SpO2 97%   BMI 43.27 kg/m²       Physical Exam    Assessment/Plan   Problem List Items Addressed This Visit    None           HPI: 68-year-old male presenting for follow-up on chronic conditions, Medicare annual wellness exam/CPE.  Patient doing relatively well today.    HTN  Stable, not taking any medications currently.  Not checking blood pressure at home.     HLD  Stable, tolerating current regimen well.     Prostate cancer, varicocele  Upcoming appointment next month.     Vitamin D deficiency  Has been taking the 50,000 units weekly, but missed read and has been taking them daily.     Gout  Having periodic flares, does not want preventative medication, does well with indomethacin as needed.    Elevated TSH  Saw endocrinology in the interval, labs mostly unremarkable    SocHx:   - Smoking: Daily smoker, approximately 50 years    12 point ROS reviewed and negative other than as stated in HPI      General: Alert, oriented, pleasant, in no acute distress  HEENT:      Head: normocephalic, atraumatic;      eyes: EOMI, no scleral icterus;   Neck: soft, supple, non-tender, no masses appreciated  CV: Heart with regular rate and rhythm, normal S1/S2, no murmurs  Lungs: CTAB without wheezing, rhonchi or rales; good respiratory effort, no increased work of breathing  Abdomen: soft, non-tender, non-distended, no masses appreciated, limited by body habitus  Extremities: no edema, no cyanosis  Neuro: Cranial nerves grossly intact; alert and oriented  Psych: " Appropriate mood and affect    #HM  -CBC, CMP, Lipid panel, Vit D, TSH with reflex T4, PSA  -Vaccines:       Flu: IO      Shingrix: Recommended, advised to go to local pharmacy      Pneumococcal: IO      Tdap: UTD 2017  -Lung cancer screening with low-dose CT: 50-pack-year history, ordered  -Colonoscopy: 2023, 3-5 year plan  -AAA screening: Negative 2023    #HTN  - Slightly high, no medication  - Given home BP logs, to call the office if above 130/80, will consider treatment     # HLD  - Continue atorvastatin 40 mg daily  - Repeat lipid panel     #Prostate cancer #Varicocele of testicle  - Upcoming appointment with urology     #Vitamin D deficiency  - Repeat lab     # Gout  - Continue indomethacin as needed  - Last uric acid 8.4, declines maintenance med     #Elevated TSH on last lab  - Repeat TSH  -Continue to follow with endocrinology    F/U 6 months, sooner if indicated    Chris D'Amico, DO

## 2024-03-07 ENCOUNTER — TELEMEDICINE (OUTPATIENT)
Dept: PHARMACY | Facility: HOSPITAL | Age: 69
End: 2024-03-07
Payer: COMMERCIAL

## 2024-03-07 DIAGNOSIS — Z00.00 MEDICARE ANNUAL WELLNESS VISIT, SUBSEQUENT: ICD-10-CM

## 2024-03-07 DIAGNOSIS — E55.9 VITAMIN D DEFICIENCY: ICD-10-CM

## 2024-03-07 DIAGNOSIS — Z12.5 SCREENING FOR PROSTATE CANCER: ICD-10-CM

## 2024-03-07 DIAGNOSIS — F17.200 NICOTINE USE DISORDER: ICD-10-CM

## 2024-03-07 DIAGNOSIS — I10 HYPERTENSION, UNSPECIFIED TYPE: ICD-10-CM

## 2024-03-07 DIAGNOSIS — E78.5 HYPERLIPIDEMIA, UNSPECIFIED HYPERLIPIDEMIA TYPE: ICD-10-CM

## 2024-03-07 DIAGNOSIS — R79.89 ELEVATED TSH: ICD-10-CM

## 2024-03-07 DIAGNOSIS — I86.1 VARICOCELE PRESENT ON ULTRASOUND OF SCROTUM: ICD-10-CM

## 2024-03-07 DIAGNOSIS — C61 PROSTATE CANCER (MULTI): ICD-10-CM

## 2024-03-07 DIAGNOSIS — Z00.00 WELLNESS EXAMINATION: ICD-10-CM

## 2024-03-07 NOTE — PROGRESS NOTES
"[place] [address] [facility]  Dinesh Weiss is a 68 y.o. male was referred to Clinical Pharmacy Team for a medication reconciliation.     Referring Provider: Christopher D'Amico, DO     HISTORY OF PRESENT ILLNESS   Patient referred to the pharmacy team for chronic disease state management and a medication reconciliation     MEDICATION ASSESSMENT  - Colchicine 0.6 mg once daily and second dose prn   - Atorvastatin 40 mg once daily  - Myrbetriq 50 mg once daily    LAB REVIEW  Glucose (mg/dL)   Date Value   05/12/2023 82   01/06/2023 88   09/14/2021 85     Hemoglobin A1C (%)   Date Value   09/14/2021 5.4     Bicarbonate (mmol/L)   Date Value   05/12/2023 28   01/06/2023 25   09/14/2021 28     Urea Nitrogen (mg/dL)   Date Value   05/12/2023 18   01/06/2023 10   09/14/2021 11     Creatinine (mg/dL)   Date Value   05/12/2023 1.46 (H)   01/06/2023 1.14   09/14/2021 1.28     Lab Results   Component Value Date    CHOL 247 (H) 02/22/2023    CHOL 126 09/14/2021    CHOL 143 08/18/2020     Lab Results   Component Value Date    HDL 53.3 02/22/2023    HDL 45.0 09/14/2021    HDL 44.4 08/18/2020     No results found for: \"LDLCALC\"  Lab Results   Component Value Date    TRIG 98 02/22/2023    TRIG 81 09/14/2021    TRIG 107 08/18/2020     PATIENT EDUCATION/DISCUSSION:  - Went over medications with patient   - Counseled patient on MOA, expectations, side effects, duration of therapy, contraindications, administration, and monitoring parameters  - Answered all patient questions and concerns    PLAN  Continue all meds under the continuation of care with the referring provider and clinical pharmacy team.  Future considerations:   ACE/ARB for renal protection  Deferring at this time as patient is confused regarding his current medications     Clinical Pharmacist follow-up: as needed    Thank you,   Lorene Titus, PharmD    Verbal consent to manage patient's drug therapy was obtained from the patient. They were informed they may " decline to participate or withdraw from participation in pharmacy services at any time.

## 2024-03-11 ENCOUNTER — HOSPITAL ENCOUNTER (OUTPATIENT)
Dept: RADIOLOGY | Facility: CLINIC | Age: 69
Discharge: HOME | End: 2024-03-11
Payer: COMMERCIAL

## 2024-03-11 DIAGNOSIS — F17.210 NICOTINE DEPENDENCE, CIGARETTES, UNCOMPLICATED: ICD-10-CM

## 2024-03-11 DIAGNOSIS — F17.200 NICOTINE USE DISORDER: ICD-10-CM

## 2024-03-11 PROCEDURE — 71271 CT THORAX LUNG CANCER SCR C-: CPT

## 2024-03-11 PROCEDURE — 71271 CT THORAX LUNG CANCER SCR C-: CPT | Performed by: RADIOLOGY

## 2024-03-13 ENCOUNTER — TELEPHONE (OUTPATIENT)
Dept: PRIMARY CARE | Facility: CLINIC | Age: 69
End: 2024-03-13
Payer: COMMERCIAL

## 2024-03-13 NOTE — TELEPHONE ENCOUNTER
----- Message from Christopher D'Amico, DO sent at 3/13/2024 11:11 AM EDT -----  No suspicious nodules on exam, there was some limitations, but recommended surveillance with 1 year follow-up.    There were some lymph nodes in the right armpit, thought to be reactive/inflammatory (benign).  Should be felt on next exam, decided follow-up imaging is necessary.

## 2024-03-13 NOTE — TELEPHONE ENCOUNTER
Result Communication    Resulted Orders   CT lung screening low dose    Narrative    Interpreted By:  Kyler Acuna,   STUDY:  CT LUNG SCREENING LOW DOSE;  3/11/2024 10:16 am      INDICATION:  Signs/Symptoms:screen.      COMPARISON:  03/09/2023      ACCESSION NUMBER(S):  [id number]      ORDERING CLINICIAN:  CHRISTOPHER D'AMICO      TECHNIQUE:  Helical data acquisition of the chest was obtained without IV  contrast material.  Images were reformatted in axial, coronal, and  sagittal planes.      FINDINGS:      Evaluation is somewhat limited by low-dose technique and increased  image noise. LUNGS AND AIRWAYS:  The trachea and central airways are patent. No endobronchial lesion  is seen.      There is mild centrilobular emphysematous changes. There is diffuse  peribronchial thickening. There is a stable 2 mm posterior segment  right upper lobe parenchymal lung nodulethere is no focal  consolidation, pleural effusion, or pneumothorax.              MEDIASTINUM AND CHACORTA, LOWER NECK AND AXILLA:  The visualized thyroid gland is within normal limits.  Scattered mediastinal lymph nodes are felt to be reactive in nature.  There are mildly prominent right axillary lymph nodes most likely  represent reactive/inflammatory change. Recommend correlation with  physical exam. Esophagus appears within normal limits as seen.      HEART AND VESSELS:  The thoracic aorta normal in course and caliber.  Main pulmonary artery and its branches are normal in caliber.  There is mild coronary artery calcifications: CT calcium score: 1.  The cardiac chambers are not enlarged.  There is no pericardial effusion seen.      UPPER ABDOMEN:  The visualized subdiaphragmatic structures demonstrate no remarkable  findings.              CHEST WALL AND OSSEOUS STRUCTURES:  Chest wall is within normal limits.  No acute osseous pathology.There are no suspicious osseous  lesions.Multilevel degenerative changes of the thoracic spine        Impression    1.  No suspicious parenchymal lung nodules.  2. Please note evaluation somewhat limited by excessive image noise.  Recommend surveillance with 1 year low-dose chest CT.  3. Right axillary lymph nodes which most likely are  reactive/inflammatory. Please correlate with exam          LUNG RADS CATEGORY:  Lung Rad: Lung-RADS 2 (Benign Appearance or Indolent Behavior)      Recommendation: Continue annual screening with Low Dose Chest CT in  12 months, recommended as per [employer] College [employer]  Guidelines Lung-RADS Version 2022.          MACRO:  None      Signed by: Kyler Acuna 3/12/2024 7:51 AM  Dictation workstation:   [id number]       12:08 PM      Results were not successfully communicated with the patient and they did not acknowledge their understanding.

## 2024-03-29 ENCOUNTER — OFFICE VISIT (OUTPATIENT)
Dept: UROLOGY | Facility: HOSPITAL | Age: 69
End: 2024-03-29
Payer: COMMERCIAL

## 2024-03-29 ENCOUNTER — LAB (OUTPATIENT)
Dept: LAB | Facility: LAB | Age: 69
End: 2024-03-29
Payer: COMMERCIAL

## 2024-03-29 DIAGNOSIS — N52.9 MALE ERECTILE DISORDER OF ORGANIC ORIGIN: ICD-10-CM

## 2024-03-29 DIAGNOSIS — F17.200 NICOTINE USE DISORDER: ICD-10-CM

## 2024-03-29 DIAGNOSIS — E78.5 HYPERLIPIDEMIA, UNSPECIFIED HYPERLIPIDEMIA TYPE: ICD-10-CM

## 2024-03-29 DIAGNOSIS — E55.9 VITAMIN D DEFICIENCY: ICD-10-CM

## 2024-03-29 DIAGNOSIS — N39.41 URGE INCONTINENCE OF URINE: ICD-10-CM

## 2024-03-29 DIAGNOSIS — Z12.5 SCREENING FOR PROSTATE CANCER: ICD-10-CM

## 2024-03-29 DIAGNOSIS — I10 HYPERTENSION, UNSPECIFIED TYPE: ICD-10-CM

## 2024-03-29 DIAGNOSIS — Z00.00 MEDICARE ANNUAL WELLNESS VISIT, SUBSEQUENT: ICD-10-CM

## 2024-03-29 DIAGNOSIS — I86.1 VARICOCELE PRESENT ON ULTRASOUND OF SCROTUM: ICD-10-CM

## 2024-03-29 DIAGNOSIS — R79.89 ELEVATED TSH: ICD-10-CM

## 2024-03-29 DIAGNOSIS — C61 PROSTATE CANCER (MULTI): ICD-10-CM

## 2024-03-29 DIAGNOSIS — Z00.00 WELLNESS EXAMINATION: ICD-10-CM

## 2024-03-29 DIAGNOSIS — C61 PROSTATE CANCER (MULTI): Primary | ICD-10-CM

## 2024-03-29 LAB
25(OH)D3 SERPL-MCNC: 24 NG/ML (ref 30–100)
ALBUMIN SERPL BCP-MCNC: 4.3 G/DL (ref 3.4–5)
ALP SERPL-CCNC: 73 U/L (ref 33–136)
ALT SERPL W P-5'-P-CCNC: 25 U/L (ref 10–52)
ANION GAP SERPL CALC-SCNC: 12 MMOL/L (ref 10–20)
AST SERPL W P-5'-P-CCNC: 17 U/L (ref 9–39)
BILIRUB SERPL-MCNC: 0.8 MG/DL (ref 0–1.2)
BUN SERPL-MCNC: 7 MG/DL (ref 6–23)
CALCIUM SERPL-MCNC: 9.1 MG/DL (ref 8.6–10.3)
CHLORIDE SERPL-SCNC: 106 MMOL/L (ref 98–107)
CHOLEST SERPL-MCNC: 127 MG/DL (ref 0–199)
CHOLESTEROL/HDL RATIO: 2.5
CO2 SERPL-SCNC: 27 MMOL/L (ref 21–32)
CREAT SERPL-MCNC: 1.15 MG/DL (ref 0.5–1.3)
EGFRCR SERPLBLD CKD-EPI 2021: 69 ML/MIN/1.73M*2
ERYTHROCYTE [DISTWIDTH] IN BLOOD BY AUTOMATED COUNT: 12.3 % (ref 11.5–14.5)
GLUCOSE SERPL-MCNC: 80 MG/DL (ref 74–99)
HCT VFR BLD AUTO: 44.5 % (ref 41–52)
HDLC SERPL-MCNC: 50.9 MG/DL
HGB BLD-MCNC: 15 G/DL (ref 13.5–17.5)
LDLC SERPL CALC-MCNC: 58 MG/DL
MCH RBC QN AUTO: 30.9 PG (ref 26–34)
MCHC RBC AUTO-ENTMCNC: 33.7 G/DL (ref 32–36)
MCV RBC AUTO: 92 FL (ref 80–100)
NON HDL CHOLESTEROL: 76 MG/DL (ref 0–149)
NRBC BLD-RTO: 0 /100 WBCS (ref 0–0)
PLATELET # BLD AUTO: 158 X10*3/UL (ref 150–450)
POC APPEARANCE, URINE: CLEAR
POC BILIRUBIN, URINE: NEGATIVE
POC BLOOD, URINE: NEGATIVE
POC COLOR, URINE: YELLOW
POC GLUCOSE, URINE: NEGATIVE MG/DL
POC KETONES, URINE: NEGATIVE MG/DL
POC LEUKOCYTES, URINE: NEGATIVE
POC NITRITE,URINE: NEGATIVE
POC PH, URINE: 5.5 PH
POC PROTEIN, URINE: NEGATIVE MG/DL
POC SPECIFIC GRAVITY, URINE: 1.02
POC UROBILINOGEN, URINE: 0.2 EU/DL
POTASSIUM SERPL-SCNC: 4 MMOL/L (ref 3.5–5.3)
PROT SERPL-MCNC: 6.6 G/DL (ref 6.4–8.2)
PSA SERPL-MCNC: <0.1 NG/ML
RBC # BLD AUTO: 4.85 X10*6/UL (ref 4.5–5.9)
SODIUM SERPL-SCNC: 141 MMOL/L (ref 136–145)
TRIGL SERPL-MCNC: 89 MG/DL (ref 0–149)
TSH SERPL-ACNC: 1.1 MIU/L (ref 0.44–3.98)
VLDL: 18 MG/DL (ref 0–40)
WBC # BLD AUTO: 5.2 X10*3/UL (ref 4.4–11.3)

## 2024-03-29 PROCEDURE — 99213 OFFICE O/P EST LOW 20 MIN: CPT | Performed by: NURSE PRACTITIONER

## 2024-03-29 PROCEDURE — G0103 PSA SCREENING: HCPCS

## 2024-03-29 PROCEDURE — 80053 COMPREHEN METABOLIC PANEL: CPT

## 2024-03-29 PROCEDURE — 36415 COLL VENOUS BLD VENIPUNCTURE: CPT

## 2024-03-29 PROCEDURE — 1160F RVW MEDS BY RX/DR IN RCRD: CPT | Performed by: NURSE PRACTITIONER

## 2024-03-29 PROCEDURE — 80061 LIPID PANEL: CPT

## 2024-03-29 PROCEDURE — 51798 US URINE CAPACITY MEASURE: CPT | Performed by: NURSE PRACTITIONER

## 2024-03-29 PROCEDURE — 1159F MED LIST DOCD IN RCRD: CPT | Performed by: NURSE PRACTITIONER

## 2024-03-29 PROCEDURE — 81002 URINALYSIS NONAUTO W/O SCOPE: CPT | Performed by: NURSE PRACTITIONER

## 2024-03-29 PROCEDURE — 3008F BODY MASS INDEX DOCD: CPT | Performed by: NURSE PRACTITIONER

## 2024-03-29 PROCEDURE — 82306 VITAMIN D 25 HYDROXY: CPT

## 2024-03-29 PROCEDURE — 85027 COMPLETE CBC AUTOMATED: CPT

## 2024-03-29 PROCEDURE — 84443 ASSAY THYROID STIM HORMONE: CPT

## 2024-03-29 NOTE — PROGRESS NOTES
[facility]  Outpatient Clinic Note    Subjective   Dinesh Weiss is a 68 y.o. male 6 month FUV     History of Present Illness   69 yo male presents with his wife for FUV History of Vasectomy, Urinary frequency, taking Myrbetriq 50 mg, ED sp RALP 12/2020 by Dr. Pino Severe ED s/p RALP. Last visit with myself 02/17/2023. No gross hematuria, dysuria, has frequency, urgency, and occasional incontinence. Myrbetriq has improved symptoms. Patient is . He has tried Viagra/sildenafil and Trimix in the past- response: not effective, Libido/Desire: strong. Undecided regarding IPP    Urinalysis Negative   PVR 0 ml     Lab Results   Component Value Date    PSA <0.10 08/14/2023    PSA <0.10 02/17/2023    PSA <0.10 09/22/2022    PSA <0.10 03/28/2022    PSA <0.10 12/15/2021    PSA <0.10 07/12/2021    PSA <0.10 04/24/2021    PSA <0.10 01/14/2021    PSA 5.5 (H) 08/13/2020    PSA 4.4 (H) 06/08/2020     Past Medical History and Surgical History   Past Medical History:   Diagnosis Date    Encounter for preprocedural cardiovascular examination 06/13/2013    Pre-operative cardiovascular examination    Male erectile dysfunction, unspecified 08/13/2020    Organic impotence    Osteoarthritis of hip, unspecified 06/13/2013    Osteoarthritis of hip    Other cervical disc degeneration, unspecified cervical region 06/13/2013    Degeneration of cervical intervertebral disc    Other conditions influencing health status 06/13/2013    Shoulder Joint Disorder    Other conditions influencing health status 06/13/2013    Difficulty Breathing (Dyspnea)    Other specified soft tissue disorders 06/13/2013    Limb swelling    Personal history of other diseases of the musculoskeletal system and connective tissue 08/13/2020    History of backache    Personal history of other diseases of the nervous system and sense organs     History of cataract    Personal history of other malignant neoplasm of kidney 04/15/2021    History of other  malignant neoplasm of kidney    Personal history of other specified conditions 06/13/2013    History of headache    Spondylosis without myelopathy or radiculopathy, cervical region 06/13/2013    Cervical spondylosis     Past Surgical History:   Procedure Laterality Date    OTHER SURGICAL HISTORY  08/13/2020    Partial Nephrectomy    TOTAL HIP ARTHROPLASTY  08/13/2020    Hip Replacement       Medications  Current Outpatient Medications on File Prior to Visit   Medication Sig Dispense Refill    aspirin 81 mg EC tablet Take 1 tablet (81 mg) by mouth once daily.      atorvastatin (Lipitor) 40 mg tablet Take 1 tablet (40 mg) by mouth once daily.      colchicine 0.6 mg tablet Take 1 tablet (0.6 mg) by mouth once daily. 180 tablet 2    indomethacin (Indocin) 50 mg capsule Take 1-3 capsules ( mg) by mouth once daily as needed.      Myrbetriq 50 mg tablet extended release 24 hr 24 hr tablet Take 1 tablet (50 mg) by mouth once daily. 90 tablet 3     No current facility-administered medications on file prior to visit.       Objective   Physicial Exam  General: Well developed, well nourished, alert and cooperative, appears in no acute distress  Eyes: Non-injected conjunctiva, sclera clear, no proptosis  Cardiac: Extremities are warm and well perfused. No edema, cyanosis or pallor.   Lungs: Breathing is easy, non-labored. Speaking in clear and complete sentences. Normal diaphragmatic movement.  MSK: Ambulatory with steady gait, unassisted  Neuro: alert and oriented to person, place and time  Psych: Demonstrates good judgement and reason, without hallucinations, abnormal affect or abnormal behaviors.  Skin: no obvious lesions, no rashes.    Review of Systems  All other systems have been reviewed and are negative for complaint.      Assessment and Plan   -ED  Refractory to pde5i and Trimix. Declines further intervention. Considering IPP     -Prostate Cancer   PSA undetectable 08/2023     -Urinary Frequency   Continue  Myrbetriq 50 mg   We discussed the pathophysiology of overactive bladder. We discussed possible treatment options including doing conservative voiding techniques, medications, and surgical options. Patient was counseled regarding bladder retraining, diet choices, and fluid restriction.     Patient was informed that first line treatment is behavioral therapy. This includes:   -Fluid balancing and sometimes restriction   -Reducing caffeine or other bladder stimulants   -Bladder retraining  -Avoid constipation  -Avoid activities that exacerbate incontinence       -Kegel exercises and pelvic floor muscle training    Patient was informed that second line treatment includes medications. We discussed Mirabegron and that the side effects include possible increase in blood pressure in a small minority of patients, however insurance does not always cover this. We also discussed anticholinergic medications which can have the side effects of dry eyes, dry mouth, constipation and rarely cognitive changes.     I mentioned 3rd line management options of neuromodulation and Botox injections as well    Please obtain PSA now and RTC in 6 months with PSA prior, sooner if needed.     All questions and concerns were addressed. Patient verbalizes understanding and has no other questions at this time. You are able to have email access to your chart. You can sign into [employer] or add the [employer] [facility] jens on your smart phone to review today's visit, laboratory work and imaging.   If you have any questions about your care, do not hesitate to call and leave a message, we return calls in a timely manner.    Silvina Ortega-- BIJU ADLER  Office Phone:  [phone]

## 2024-04-01 ENCOUNTER — TELEPHONE (OUTPATIENT)
Dept: PRIMARY CARE | Facility: CLINIC | Age: 69
End: 2024-04-01
Payer: COMMERCIAL

## 2024-04-01 NOTE — TELEPHONE ENCOUNTER
Result Communication    Resulted Orders   CBC   Result Value Ref Range    WBC 5.2 4.4 - 11.3 x10*3/uL    nRBC 0.0 0.0 - 0.0 /100 WBCs    RBC 4.85 4.50 - 5.90 x10*6/uL    Hemoglobin 15.0 13.5 - 17.5 g/dL    Hematocrit 44.5 41.0 - 52.0 %    MCV 92 80 - 100 fL    MCH 30.9 26.0 - 34.0 pg    MCHC 33.7 32.0 - 36.0 g/dL    RDW 12.3 11.5 - 14.5 %    Platelets 158 150 - 450 x10*3/uL   Lipid Panel   Result Value Ref Range    Cholesterol 127 0 - 199 mg/dL      Comment:            Age      Desirable   Borderline High   High     0-19 Y     0 - 169       170 - 199     >/= 200    20-24 Y     0 - 189       190 - 224     >/= 225         >24 Y     0 - 199       200 - 239     >/= 240   **All ranges are based on fasting samples. Specific   therapeutic targets will vary based on patient-specific   cardiac risk.    Pediatric guidelines reference:Pediatrics 2011, 128(S5).Adult guidelines reference: NCEP ATPIII Guidelines,MACIE 2001, 258:2486-97    Venipuncture immediately after or during the administration of Metamizole may lead to falsely low results. Testing should be performed immediately prior to Metamizole dosing.    HDL-Cholesterol 50.9 mg/dL      Comment:        Age       Very Low   Low     Normal    High    0-19 Y    < 35      < 40     40-45     ----  20-24 Y    ----     < 40      >45      ----        >24 Y      ----     < 40     40-60      >60      Cholesterol/HDL Ratio 2.5       Comment:        Ref Values  Desirable  < 3.4  High Risk  > 5.0    LDL Calculated 58 <=99 mg/dL      Comment:                                  Near   Borderline      AGE      Desirable  Optimal    High     High     Very High     0-19 Y     0 - 109     ---    110-129   >/= 130     ----    20-24 Y     0 - 119     ---    120-159   >/= 160     ----      >24 Y     0 -  99   100-129  130-159   160-189     >/=190      VLDL 18 0 - 40 mg/dL    Triglycerides 89 0 - 149 mg/dL      Comment:         Age         Desirable   Borderline High   High     Very High   0 D-90  D    19 - 174         ----         ----        ----  91 D- 9 Y     0 -  74        75 -  99     >/= 100      ----    10-19 Y     0 -  89        90 - 129     >/= 130      ----    20-24 Y     0 - 114       115 - 149     >/= 150      ----         >24 Y     0 - 149       150 - 199    200- 499    >/= 500    Venipuncture immediately after or during the administration of Metamizole may lead to falsely low results. Testing should be performed immediately prior to Metamizole dosing.    Non HDL Cholesterol 76 0 - 149 mg/dL      Comment:            Age       Desirable   Borderline High   High     Very High     0-19 Y     0 - 119       120 - 144     >/= 145    >/= 160    20-24 Y     0 - 149       150 - 189     >/= 190      ----         >24 Y    30 mg/dL above LDL Cholesterol goal     Comprehensive Metabolic Panel   Result Value Ref Range    Glucose 80 74 - 99 mg/dL    Sodium 141 136 - 145 mmol/L    Potassium 4.0 3.5 - 5.3 mmol/L    Chloride 106 98 - 107 mmol/L    Bicarbonate 27 21 - 32 mmol/L    Anion Gap 12 10 - 20 mmol/L    Urea Nitrogen 7 6 - 23 mg/dL    Creatinine 1.15 0.50 - 1.30 mg/dL    eGFR 69 >60 mL/min/1.73m*2      Comment:      Calculations of estimated GFR are performed using the 2021 CKD-EPI Study Refit equation without the race variable for the IDMS-Traceable creatinine methods.  https://jasn.asnjournals.org/content/early/2021/09/22/ASN.8600816388    Calcium 9.1 8.6 - 10.3 mg/dL    Albumin 4.3 3.4 - 5.0 g/dL    Alkaline Phosphatase 73 33 - 136 U/L    Total Protein 6.6 6.4 - 8.2 g/dL    AST 17 9 - 39 U/L    Bilirubin, Total 0.8 0.0 - 1.2 mg/dL    ALT 25 10 - 52 U/L      Comment:      Patients treated with Sulfasalazine may generate falsely decreased results for ALT.   TSH with reflex to Free T4 if abnormal   Result Value Ref Range    Thyroid Stimulating Hormone 1.10 0.44 - 3.98 mIU/L    Narrative    TSH testing is performed using different testing methodology at [facility] than at other system  [facility]. Direct result comparisons should only be made within the same method.     Vitamin D 25-Hydroxy,Total (for eval of Vitamin D levels)   Result Value Ref Range    Vitamin D, 25-Hydroxy, Total 24 (L) 30 - 100 ng/mL    Narrative    Deficiency:         < 20   ng/ml  Insufficiency:      20-29  ng/ml  Sufficiency:         ng/ml  This assay accurately quantifies the sum of Vitamin D3, 25-Hydroxy and Vitamin D2,25-Hydroxy.   Prostate Specific Antigen   Result Value Ref Range    Prostate Specific AG <0.10 <=4.00 ng/mL    Narrative    The FDA requires that the method used for PSA assay be reported to the physician. Values obtained with different assay methods must not be used interchangeably. This test was performed at [facility] using Siemens Placed PSA method, which is a sandwich immunoassay using chemiluminescence for quantitation. The assay is approved for measurement of prostate-specific antigen (PSA) in serum and may be used in conjunction with a digital rectal examination in men 50 years and older as an aid in the detection of prostate cancer. 5 Alpha-reductase inhibitors (e.g., Proscar, Finasteride, Avodart, Dutasteride, and Melissa) for the treatment of BPH have been shown to lower PSA levels by an average of 50% after 6 months of treatment.            3:03 PM      Results were successfully communicated with the patient and they acknowledged their understanding.

## 2024-04-01 NOTE — TELEPHONE ENCOUNTER
----- Message from Christopher D'Amico, DO sent at 4/1/2024 12:34 PM EDT -----  Vitamin D mildly low at 24, if not taking supplementation, would recommend OTC vitamin D3, 1000 units daily.    Remaining labs essentially unremarkable.

## 2024-04-08 DIAGNOSIS — E78.5 HYPERLIPIDEMIA, UNSPECIFIED HYPERLIPIDEMIA TYPE: ICD-10-CM

## 2024-04-08 RX ORDER — ATORVASTATIN CALCIUM 40 MG/1
40 TABLET, FILM COATED ORAL DAILY
Qty: 90 TABLET | Refills: 2 | Status: SHIPPED | OUTPATIENT
Start: 2024-04-08

## 2024-08-20 ENCOUNTER — APPOINTMENT (OUTPATIENT)
Dept: PRIMARY CARE | Facility: CLINIC | Age: 69
End: 2024-08-20
Payer: COMMERCIAL

## 2024-08-21 ENCOUNTER — APPOINTMENT (OUTPATIENT)
Dept: PRIMARY CARE | Facility: CLINIC | Age: 69
End: 2024-08-21
Payer: COMMERCIAL

## 2024-08-21 VITALS
HEIGHT: 65 IN | HEART RATE: 83 BPM | SYSTOLIC BLOOD PRESSURE: 125 MMHG | OXYGEN SATURATION: 94 % | BODY MASS INDEX: 44.15 KG/M2 | DIASTOLIC BLOOD PRESSURE: 79 MMHG | WEIGHT: 265 LBS

## 2024-08-21 DIAGNOSIS — M10.9 GOUT, UNSPECIFIED CAUSE, UNSPECIFIED CHRONICITY, UNSPECIFIED SITE: ICD-10-CM

## 2024-08-21 DIAGNOSIS — R79.89 ELEVATED TSH: ICD-10-CM

## 2024-08-21 DIAGNOSIS — C61 PROSTATE CANCER (MULTI): ICD-10-CM

## 2024-08-21 DIAGNOSIS — I86.1 VARICOCELE PRESENT ON ULTRASOUND OF SCROTUM: ICD-10-CM

## 2024-08-21 DIAGNOSIS — E78.5 HYPERLIPIDEMIA, UNSPECIFIED HYPERLIPIDEMIA TYPE: Primary | ICD-10-CM

## 2024-08-21 DIAGNOSIS — M54.42 CHRONIC BILATERAL LOW BACK PAIN WITH BILATERAL SCIATICA: ICD-10-CM

## 2024-08-21 DIAGNOSIS — M54.41 CHRONIC BILATERAL LOW BACK PAIN WITH BILATERAL SCIATICA: ICD-10-CM

## 2024-08-21 DIAGNOSIS — I10 HYPERTENSION, UNSPECIFIED TYPE: ICD-10-CM

## 2024-08-21 DIAGNOSIS — G89.29 CHRONIC BILATERAL LOW BACK PAIN WITH BILATERAL SCIATICA: ICD-10-CM

## 2024-08-21 PROBLEM — R11.0 NAUSEA: Status: ACTIVE | Noted: 2024-08-21

## 2024-08-21 PROBLEM — M79.675 PAIN IN TOES OF BOTH FEET: Status: ACTIVE | Noted: 2024-08-21

## 2024-08-21 PROBLEM — M50.30 DEGENERATION OF INTERVERTEBRAL DISC OF CERVICAL REGION: Status: ACTIVE | Noted: 2024-08-21

## 2024-08-21 PROBLEM — Z86.69 HISTORY OF CATARACT: Status: ACTIVE | Noted: 2024-08-21

## 2024-08-21 PROBLEM — M79.671 RIGHT FOOT PAIN: Status: ACTIVE | Noted: 2024-08-21

## 2024-08-21 PROBLEM — M16.9 OSTEOARTHRITIS OF HIP: Status: ACTIVE | Noted: 2024-08-21

## 2024-08-21 PROBLEM — R94.31 ABNORMAL ELECTROCARDIOGRAPHY: Status: ACTIVE | Noted: 2023-05-12

## 2024-08-21 PROBLEM — L84 CALLOSITY: Status: ACTIVE | Noted: 2024-08-21

## 2024-08-21 PROBLEM — N40.0 BENIGN PROSTATIC HYPERPLASIA: Status: ACTIVE | Noted: 2024-08-21

## 2024-08-21 PROBLEM — C64.9: Status: ACTIVE | Noted: 2024-08-21

## 2024-08-21 PROBLEM — Z97.3 WEARS EYEGLASSES: Status: ACTIVE | Noted: 2024-08-21

## 2024-08-21 PROBLEM — M17.10 ARTHRITIS OF KNEE: Status: ACTIVE | Noted: 2024-08-21

## 2024-08-21 PROBLEM — M79.674 PAIN IN TOES OF BOTH FEET: Status: ACTIVE | Noted: 2024-08-21

## 2024-08-21 PROBLEM — N50.9 DISORDER OF MALE GENITAL ORGANS: Status: ACTIVE | Noted: 2024-08-21

## 2024-08-21 PROCEDURE — 3074F SYST BP LT 130 MM HG: CPT | Performed by: STUDENT IN AN ORGANIZED HEALTH CARE EDUCATION/TRAINING PROGRAM

## 2024-08-21 PROCEDURE — 99214 OFFICE O/P EST MOD 30 MIN: CPT | Performed by: STUDENT IN AN ORGANIZED HEALTH CARE EDUCATION/TRAINING PROGRAM

## 2024-08-21 PROCEDURE — 3008F BODY MASS INDEX DOCD: CPT | Performed by: STUDENT IN AN ORGANIZED HEALTH CARE EDUCATION/TRAINING PROGRAM

## 2024-08-21 PROCEDURE — 1160F RVW MEDS BY RX/DR IN RCRD: CPT | Performed by: STUDENT IN AN ORGANIZED HEALTH CARE EDUCATION/TRAINING PROGRAM

## 2024-08-21 PROCEDURE — G2211 COMPLEX E/M VISIT ADD ON: HCPCS | Performed by: STUDENT IN AN ORGANIZED HEALTH CARE EDUCATION/TRAINING PROGRAM

## 2024-08-21 PROCEDURE — 3078F DIAST BP <80 MM HG: CPT | Performed by: STUDENT IN AN ORGANIZED HEALTH CARE EDUCATION/TRAINING PROGRAM

## 2024-08-21 PROCEDURE — 1159F MED LIST DOCD IN RCRD: CPT | Performed by: STUDENT IN AN ORGANIZED HEALTH CARE EDUCATION/TRAINING PROGRAM

## 2024-08-21 RX ORDER — PREDNISONE 20 MG/1
40 TABLET ORAL DAILY
Qty: 10 TABLET | Refills: 0 | Status: SHIPPED | OUTPATIENT
Start: 2024-08-21 | End: 2024-08-26

## 2024-08-21 ASSESSMENT — COLUMBIA-SUICIDE SEVERITY RATING SCALE - C-SSRS
1. IN THE PAST MONTH, HAVE YOU WISHED YOU WERE DEAD OR WISHED YOU COULD GO TO SLEEP AND NOT WAKE UP?: NO
2. HAVE YOU ACTUALLY HAD ANY THOUGHTS OF KILLING YOURSELF?: NO
6. HAVE YOU EVER DONE ANYTHING, STARTED TO DO ANYTHING, OR PREPARED TO DO ANYTHING TO END YOUR LIFE?: NO

## 2024-08-21 ASSESSMENT — ENCOUNTER SYMPTOMS
OCCASIONAL FEELINGS OF UNSTEADINESS: 1
LOSS OF SENSATION IN FEET: 0
DEPRESSION: 0

## 2024-08-21 NOTE — PROGRESS NOTES
69-year-old male presenting for follow-up on multiple concerns.    HTN  Stable, not taking any medications currently.  Not checking blood pressure at home.     HLD  Stable, tolerating current regimen well.     Prostate cancer, varicocele  Stable, follows with urology     Gout  Having periodic flares, does not want preventative medication, does well with indomethacin as needed.    Elevated TSH  Saw endocrinology in the interval, labs mostly unremarkable    Chronic low back pain with radicular symptoms  Could not tolerate physical therapy in the past.  Difficulty standing up and walking.    SocHx:   - Smoking: Daily smoker, approximately 50 years    12 point ROS reviewed and negative other than as stated in HPI      General: Alert, oriented, pleasant, in no acute distress  HEENT:      Head: normocephalic, atraumatic;      eyes: EOMI, no scleral icterus;   Neck: soft, supple, non-tender, no masses appreciated  CV: Heart with regular rate and rhythm, normal S1/S2, no murmurs  Lungs: CTAB without wheezing, rhonchi or rales; good respiratory effort, no increased work of breathing  Neuro: Cranial nerves grossly intact; alert and oriented  Psych: Appropriate mood and affect    #HTN  - At goal in office without medication  - Continue to recommend home monitoring     # HLD  - Continue atorvastatin 40 mg daily  - Repeat lipid panel     #Prostate cancer #Varicocele of testicle  - Follows with urology     # Gout  - Continue indomethacin as needed  - Last uric acid 8.4, declines maintenance med     #Elevated TSH   - Repeat TSH  -Continue to follow with endocrinology    #Low back pain with bilateral radiculopathy  -Plain film lumbar spine  - Recommend physical therapy, patient cannot tolerate  - Prednisone 40 mg x 5 days  - Will refer to pain management if not improving    F/U 6 months, sooner if indicated, Medicare at that time    Chris D'Amico, DO

## 2024-08-22 ENCOUNTER — HOSPITAL ENCOUNTER (OUTPATIENT)
Dept: RADIOLOGY | Facility: CLINIC | Age: 69
Discharge: HOME | End: 2024-08-22
Payer: COMMERCIAL

## 2024-08-22 ENCOUNTER — LAB (OUTPATIENT)
Dept: LAB | Facility: LAB | Age: 69
End: 2024-08-22
Payer: COMMERCIAL

## 2024-08-22 DIAGNOSIS — M54.41 CHRONIC BILATERAL LOW BACK PAIN WITH BILATERAL SCIATICA: ICD-10-CM

## 2024-08-22 DIAGNOSIS — G89.29 CHRONIC BILATERAL LOW BACK PAIN WITH BILATERAL SCIATICA: ICD-10-CM

## 2024-08-22 DIAGNOSIS — M54.42 CHRONIC BILATERAL LOW BACK PAIN WITH BILATERAL SCIATICA: ICD-10-CM

## 2024-08-22 DIAGNOSIS — E78.5 HYPERLIPIDEMIA, UNSPECIFIED HYPERLIPIDEMIA TYPE: ICD-10-CM

## 2024-08-22 DIAGNOSIS — I86.1 VARICOCELE PRESENT ON ULTRASOUND OF SCROTUM: ICD-10-CM

## 2024-08-22 DIAGNOSIS — C61 PROSTATE CANCER (MULTI): ICD-10-CM

## 2024-08-22 DIAGNOSIS — M10.9 GOUT, UNSPECIFIED CAUSE, UNSPECIFIED CHRONICITY, UNSPECIFIED SITE: ICD-10-CM

## 2024-08-22 DIAGNOSIS — R79.89 ELEVATED TSH: ICD-10-CM

## 2024-08-22 DIAGNOSIS — I10 HYPERTENSION, UNSPECIFIED TYPE: ICD-10-CM

## 2024-08-22 LAB
ALBUMIN SERPL BCP-MCNC: 4.5 G/DL (ref 3.4–5)
ALP SERPL-CCNC: 77 U/L (ref 33–136)
ALT SERPL W P-5'-P-CCNC: 27 U/L (ref 10–52)
ANION GAP SERPL CALC-SCNC: 12 MMOL/L (ref 10–20)
AST SERPL W P-5'-P-CCNC: 20 U/L (ref 9–39)
BILIRUB SERPL-MCNC: 0.8 MG/DL (ref 0–1.2)
BUN SERPL-MCNC: 10 MG/DL (ref 6–23)
CALCIUM SERPL-MCNC: 9.6 MG/DL (ref 8.6–10.6)
CHLORIDE SERPL-SCNC: 105 MMOL/L (ref 98–107)
CHOLEST SERPL-MCNC: 126 MG/DL (ref 0–199)
CHOLESTEROL/HDL RATIO: 3.1
CO2 SERPL-SCNC: 30 MMOL/L (ref 21–32)
CREAT SERPL-MCNC: 1.4 MG/DL (ref 0.5–1.3)
EGFRCR SERPLBLD CKD-EPI 2021: 54 ML/MIN/1.73M*2
ERYTHROCYTE [DISTWIDTH] IN BLOOD BY AUTOMATED COUNT: 12.5 % (ref 11.5–14.5)
GLUCOSE SERPL-MCNC: 90 MG/DL (ref 74–99)
HCT VFR BLD AUTO: 44.5 % (ref 41–52)
HDLC SERPL-MCNC: 40.4 MG/DL
HGB BLD-MCNC: 14.8 G/DL (ref 13.5–17.5)
LDLC SERPL CALC-MCNC: 71 MG/DL
MCH RBC QN AUTO: 31.3 PG (ref 26–34)
MCHC RBC AUTO-ENTMCNC: 33.3 G/DL (ref 32–36)
MCV RBC AUTO: 94 FL (ref 80–100)
NON HDL CHOLESTEROL: 86 MG/DL (ref 0–149)
NRBC BLD-RTO: 0 /100 WBCS (ref 0–0)
PLATELET # BLD AUTO: 166 X10*3/UL (ref 150–450)
POTASSIUM SERPL-SCNC: 4 MMOL/L (ref 3.5–5.3)
PROT SERPL-MCNC: 7.3 G/DL (ref 6.4–8.2)
RBC # BLD AUTO: 4.73 X10*6/UL (ref 4.5–5.9)
SODIUM SERPL-SCNC: 143 MMOL/L (ref 136–145)
TRIGL SERPL-MCNC: 73 MG/DL (ref 0–149)
TSH SERPL-ACNC: 0.99 MIU/L (ref 0.44–3.98)
VLDL: 15 MG/DL (ref 0–40)
WBC # BLD AUTO: 5 X10*3/UL (ref 4.4–11.3)

## 2024-08-22 PROCEDURE — 72110 X-RAY EXAM L-2 SPINE 4/>VWS: CPT

## 2024-08-22 PROCEDURE — 85027 COMPLETE CBC AUTOMATED: CPT

## 2024-08-22 PROCEDURE — 84443 ASSAY THYROID STIM HORMONE: CPT

## 2024-08-22 PROCEDURE — 80053 COMPREHEN METABOLIC PANEL: CPT

## 2024-08-22 PROCEDURE — 80061 LIPID PANEL: CPT

## 2024-08-22 NOTE — LETTER
August 26, 2024     Dinesh Weiss  [address]  [place] [address]      Dear Mr. Weiss:    Below are the results from your recent visit:        Mild to moderate kidney dysfunction, tends to go up and down, mostly stable over the past year.  Important to keep blood pressure under control, avoid nephrotoxic medications, stay adequately hydrated.     Remaining labs all unremarkable.         If you have any questions or concerns, please don't hesitate to call [phone] ( Naty)

## 2024-08-23 NOTE — RESULT ENCOUNTER NOTE
Mild to moderate kidney dysfunction, tends to go up and down, mostly stable over the past year.  Important to keep blood pressure under control, avoid nephrotoxic medications, stay adequately hydrated.    Remaining labs all unremarkable.

## 2024-08-26 ENCOUNTER — TELEPHONE (OUTPATIENT)
Dept: PRIMARY CARE | Facility: CLINIC | Age: 69
End: 2024-08-26
Payer: COMMERCIAL

## 2024-08-26 NOTE — TELEPHONE ENCOUNTER
Result Communication    Resulted Orders   CBC   Result Value Ref Range    WBC 5.0 4.4 - 11.3 x10*3/uL    nRBC 0.0 0.0 - 0.0 /100 WBCs    RBC 4.73 4.50 - 5.90 x10*6/uL    Hemoglobin 14.8 13.5 - 17.5 g/dL    Hematocrit 44.5 41.0 - 52.0 %    MCV 94 80 - 100 fL    MCH 31.3 26.0 - 34.0 pg    MCHC 33.3 32.0 - 36.0 g/dL    RDW 12.5 11.5 - 14.5 %    Platelets 166 150 - 450 x10*3/uL   Comprehensive Metabolic Panel   Result Value Ref Range    Glucose 90 74 - 99 mg/dL    Sodium 143 136 - 145 mmol/L    Potassium 4.0 3.5 - 5.3 mmol/L    Chloride 105 98 - 107 mmol/L    Bicarbonate 30 21 - 32 mmol/L    Anion Gap 12 10 - 20 mmol/L    Urea Nitrogen 10 6 - 23 mg/dL    Creatinine 1.40 (H) 0.50 - 1.30 mg/dL    eGFR 54 (L) >60 mL/min/1.73m*2      Comment:      Calculations of estimated GFR are performed using the 2021 CKD-EPI Study Refit equation without the race variable for the IDMS-Traceable creatinine methods.  https://jasn.asnjournals.org/content/early/2021/09/22/ASN.5166387229    Calcium 9.6 8.6 - 10.6 mg/dL    Albumin 4.5 3.4 - 5.0 g/dL    Alkaline Phosphatase 77 33 - 136 U/L    Total Protein 7.3 6.4 - 8.2 g/dL    AST 20 9 - 39 U/L    Bilirubin, Total 0.8 0.0 - 1.2 mg/dL    ALT 27 10 - 52 U/L      Comment:      Patients treated with Sulfasalazine may generate falsely decreased results for ALT.   Lipid Panel   Result Value Ref Range    Cholesterol 126 0 - 199 mg/dL      Comment:            Age      Desirable   Borderline High   High     0-19 Y     0 - 169       170 - 199     >/= 200    20-24 Y     0 - 189       190 - 224     >/= 225         >24 Y     0 - 199       200 - 239     >/= 240   **All ranges are based on fasting samples. Specific   therapeutic targets will vary based on patient-specific   cardiac risk.    Pediatric guidelines reference:Pediatrics 2011, 128(S5).Adult guidelines reference: NCEP ATPIII Guidelines,MACIE 2001, 258:2486-97    Venipuncture immediately after or during the administration of Metamizole may lead to  falsely low results. Testing should be performed immediately prior to Metamizole dosing.    HDL-Cholesterol 40.4 mg/dL      Comment:        Age       Very Low   Low     Normal    High    0-19 Y    < 35      < 40     40-45     ----  20-24 Y    ----     < 40      >45      ----        >24 Y      ----     < 40     40-60      >60      Cholesterol/HDL Ratio 3.1       Comment:        Ref Values  Desirable  < 3.4  High Risk  > 5.0    LDL Calculated 71 <=99 mg/dL      Comment:                                  Near   Borderline      AGE      Desirable  Optimal    High     High     Very High     0-19 Y     0 - 109     ---    110-129   >/= 130     ----    20-24 Y     0 - 119     ---    120-159   >/= 160     ----      >24 Y     0 -  99   100-129  130-159   160-189     >/=190      VLDL 15 0 - 40 mg/dL    Triglycerides 73 0 - 149 mg/dL      Comment:         Age         Desirable   Borderline High   High     Very High   0 D-90 D    19 - 174         ----         ----        ----  91 D- 9 Y     0 -  74        75 -  99     >/= 100      ----    10-19 Y     0 -  89        90 - 129     >/= 130      ----    20-24 Y     0 - 114       115 - 149     >/= 150      ----         >24 Y     0 - 149       150 - 199    200- 499    >/= 500    Venipuncture immediately after or during the administration of Metamizole may lead to falsely low results. Testing should be performed immediately prior to Metamizole dosing.    Non HDL Cholesterol 86 0 - 149 mg/dL      Comment:            Age       Desirable   Borderline High   High     Very High     0-19 Y     0 - 119       120 - 144     >/= 145    >/= 160    20-24 Y     0 - 149       150 - 189     >/= 190      ----         >24 Y    30 mg/dL above LDL Cholesterol goal     TSH with reflex to Free T4 if abnormal   Result Value Ref Range    Thyroid Stimulating Hormone 0.99 0.44 - 3.98 mIU/L    Narrative    TSH testing is performed using different testing methodology at [facility] than at other  [facility]. Direct result comparisons should only be made within the same method.         11:04 AM      Results were not successfully communicated with the patient and they did not acknowledge their understanding.    Phone is not in service; sent letter     <<-----Click on this checkbox to enter Procedure Bilateral myringotomies with insertion of bilateral tympanosotmy tubes  09/11/2018    Active  ACORNSHANTELL  Examination and removal of cerumen of ear  09/11/2018    Active  SUBHASHETTA normal...

## 2024-08-26 NOTE — TELEPHONE ENCOUNTER
----- Message from Christopher D'Amico sent at 8/23/2024  1:49 PM EDT -----  Mild to moderate kidney dysfunction, tends to go up and down, mostly stable over the past year.  Important to keep blood pressure under control, avoid nephrotoxic medications, stay adequately hydrated.    Remaining labs all unremarkable.

## 2024-09-27 ENCOUNTER — LAB (OUTPATIENT)
Dept: LAB | Facility: LAB | Age: 69
End: 2024-09-27
Payer: COMMERCIAL

## 2024-09-27 ENCOUNTER — OFFICE VISIT (OUTPATIENT)
Dept: UROLOGY | Facility: CLINIC | Age: 69
End: 2024-09-27
Payer: COMMERCIAL

## 2024-09-27 VITALS — HEIGHT: 67 IN | TEMPERATURE: 96.9 F | WEIGHT: 270 LBS | BODY MASS INDEX: 42.38 KG/M2

## 2024-09-27 DIAGNOSIS — C61 PROSTATE CANCER (MULTI): ICD-10-CM

## 2024-09-27 DIAGNOSIS — R35.0 URINARY FREQUENCY: Primary | ICD-10-CM

## 2024-09-27 DIAGNOSIS — N52.9 ERECTILE DYSFUNCTION, UNSPECIFIED ERECTILE DYSFUNCTION TYPE: ICD-10-CM

## 2024-09-27 DIAGNOSIS — N39.41 URGE INCONTINENCE OF URINE: ICD-10-CM

## 2024-09-27 LAB
POC APPEARANCE, URINE: CLEAR
POC BILIRUBIN, URINE: NEGATIVE
POC BLOOD, URINE: NEGATIVE
POC COLOR, URINE: YELLOW
POC GLUCOSE, URINE: NEGATIVE MG/DL
POC KETONES, URINE: NEGATIVE MG/DL
POC LEUKOCYTES, URINE: NEGATIVE
POC NITRITE,URINE: NEGATIVE
POC PH, URINE: 5.5 PH
POC PROTEIN, URINE: NEGATIVE MG/DL
POC SPECIFIC GRAVITY, URINE: 1.01
POC UROBILINOGEN, URINE: 0.2 EU/DL
PSA SERPL-MCNC: <0.1 NG/ML

## 2024-09-27 PROCEDURE — 84153 ASSAY OF PSA TOTAL: CPT

## 2024-09-27 RX ORDER — TADALAFIL 20 MG/1
20 TABLET ORAL DAILY PRN
Qty: 10 TABLET | Refills: 0 | Status: SHIPPED | OUTPATIENT
Start: 2024-09-27 | End: 2024-10-27

## 2024-09-27 RX ORDER — MIRABEGRON 50 MG/1
50 TABLET, FILM COATED, EXTENDED RELEASE ORAL DAILY
Qty: 90 TABLET | Refills: 3 | Status: SHIPPED | OUTPATIENT
Start: 2024-09-27

## 2024-09-27 ASSESSMENT — PAIN SCALES - GENERAL: PAINLEVEL: 5

## 2024-09-27 NOTE — PROGRESS NOTES
[facility]  Outpatient Clinic Note    Subjective   Dinesh Weiss is a 69 y.o. male 6 month FUV     History of Present Illness   67 yo male presents with his wife for FUV History of Vasectomy, Urinary frequency, taking Myrbetriq 50 mg,   Severe ED sp RALP 12/2020 by Dr. Pino. No gross hematuria, dysuria, has frequency, urgency, and occasional incontinence. Myrbetriq has improved symptoms, however he ran out about a month ago. Patient is . He has tried Viagra/sildenafil and Trimix in the past- response: not effective, Libido/Desire: strong. Undecided regarding IPP. Would like to trial Cialis again.     Urinalysis Negative   PVR 6 ml     Lab Results   Component Value Date    PSA <0.10 03/29/2024    PSA <0.10 08/14/2023    PSA <0.10 02/17/2023    PSA <0.10 09/22/2022    PSA <0.10 03/28/2022    PSA <0.10 12/15/2021    PSA <0.10 07/12/2021    PSA <0.10 04/24/2021    PSA <0.10 01/14/2021    PSA 5.5 (H) 08/13/2020     Past Medical History and Surgical History   Past Medical History:   Diagnosis Date    Encounter for preprocedural cardiovascular examination 06/13/2013    Pre-operative cardiovascular examination    Male erectile dysfunction, unspecified 08/13/2020    Organic impotence    Osteoarthritis of hip, unspecified 06/13/2013    Osteoarthritis of hip    Other cervical disc degeneration, unspecified cervical region 06/13/2013    Degeneration of cervical intervertebral disc    Other conditions influencing health status 06/13/2013    Shoulder Joint Disorder    Other conditions influencing health status 06/13/2013    Difficulty Breathing (Dyspnea)    Other specified soft tissue disorders 06/13/2013    Limb swelling    Personal history of other diseases of the musculoskeletal system and connective tissue 08/13/2020    History of backache    Personal history of other diseases of the nervous system and sense organs     History of cataract    Personal history of other malignant neoplasm of kidney  04/15/2021    History of other malignant neoplasm of kidney    Personal history of other specified conditions 06/13/2013    History of headache    Spondylosis without myelopathy or radiculopathy, cervical region 06/13/2013    Cervical spondylosis     Past Surgical History:   Procedure Laterality Date    OTHER SURGICAL HISTORY  08/13/2020    Partial Nephrectomy    TOTAL HIP ARTHROPLASTY  08/13/2020    Hip Replacement       Medications  Current Outpatient Medications on File Prior to Visit   Medication Sig Dispense Refill    aspirin 81 mg EC tablet Take 1 tablet (81 mg) by mouth once daily.      atorvastatin (Lipitor) 40 mg tablet Take 1 tablet (40 mg) by mouth once daily. 90 tablet 2    colchicine 0.6 mg tablet Take 1 tablet (0.6 mg) by mouth once daily. 180 tablet 2    diaper,brief,adult,disposable misc 1 each once daily. 30 each 11    indomethacin (Indocin) 50 mg capsule Take 1-3 capsules ( mg) by mouth once daily as needed.      Myrbetriq 50 mg tablet extended release 24 hr 24 hr tablet Take 1 tablet (50 mg) by mouth once daily. 90 tablet 3     No current facility-administered medications on file prior to visit.       Objective   Physicial Exam  General: Well developed, well nourished, alert and cooperative, appears in no acute distress  Eyes: Non-injected conjunctiva, sclera clear, no proptosis  Cardiac: Extremities are warm and well perfused. No edema, cyanosis or pallor.   Lungs: Breathing is easy, non-labored. Speaking in clear and complete sentences. Normal diaphragmatic movement.  MSK: Ambulatory with steady gait, unassisted  Neuro: alert and oriented to person, place and time  Psych: Demonstrates good judgement and reason, without hallucinations, abnormal affect or abnormal behaviors.  Skin: no obvious lesions, no rashes.    Review of Systems  All other systems have been reviewed and are negative for complaint.      Assessment and Plan   -ED    Refractory to pde5i and Trimix. Considering IPP   Would  like to trial Cialia again.     -Prostate Cancer     PSA undetectable     -Urinary Frequency     Continue Myrbetriq 50 mg     We discussed the pathophysiology of overactive bladder. We discussed possible treatment options including doing conservative voiding techniques, medications, and surgical options. Patient was counseled regarding bladder retraining, diet choices, and fluid restriction.     Patient was informed that first line treatment is behavioral therapy. This includes:   -Fluid balancing and sometimes restriction   -Reducing caffeine or other bladder stimulants   -Bladder retraining  -Avoid constipation  -Avoid activities that exacerbate incontinence       -Kegel exercises and pelvic floor muscle training    Patient was informed that second line treatment includes medications. We discussed Mirabegron and that the side effects include possible increase in blood pressure in a small minority of patients, however insurance does not always cover this. We also discussed anticholinergic medications which can have the side effects of dry eyes, dry mouth, constipation and rarely cognitive changes.     I mentioned 3rd line management options of neuromodulation and Botox injections as well    Recommend PFPT. Referral given today.     Please obtain PSA now and RTC in 3 months, sooner if needed.     All questions and concerns were addressed. Patient verbalizes understanding and has no other questions at this time. You are able to have email access to your chart. You can sign into Milk or add the Follow My Health jens on your smart phone to review today's visit, laboratory work and imaging.   If you have any questions about your care, do not hesitate to call and leave a message, we return calls in a timely manner.    Silvina Ortega-- BIJU ADLER  Office Phone:  [phone]

## 2024-12-27 ENCOUNTER — APPOINTMENT (OUTPATIENT)
Dept: UROLOGY | Facility: CLINIC | Age: 69
End: 2024-12-27
Payer: COMMERCIAL

## 2024-12-31 ENCOUNTER — APPOINTMENT (OUTPATIENT)
Dept: UROLOGY | Facility: CLINIC | Age: 69
End: 2024-12-31
Payer: COMMERCIAL

## 2025-01-02 ENCOUNTER — APPOINTMENT (OUTPATIENT)
Dept: UROLOGY | Facility: CLINIC | Age: 70
End: 2025-01-02
Payer: COMMERCIAL

## 2025-02-24 ENCOUNTER — APPOINTMENT (OUTPATIENT)
Dept: PRIMARY CARE | Facility: CLINIC | Age: 70
End: 2025-02-24
Payer: COMMERCIAL

## 2025-02-24 ENCOUNTER — HOSPITAL ENCOUNTER (OUTPATIENT)
Dept: RADIOLOGY | Facility: CLINIC | Age: 70
Discharge: HOME | End: 2025-02-24
Payer: COMMERCIAL

## 2025-02-24 VITALS
OXYGEN SATURATION: 94 % | HEART RATE: 78 BPM | HEIGHT: 67 IN | SYSTOLIC BLOOD PRESSURE: 128 MMHG | DIASTOLIC BLOOD PRESSURE: 80 MMHG | WEIGHT: 268 LBS | BODY MASS INDEX: 42.06 KG/M2

## 2025-02-24 DIAGNOSIS — F17.211 NICOTINE DEPENDENCE, CIGARETTES, IN REMISSION: ICD-10-CM

## 2025-02-24 DIAGNOSIS — G89.29 CHRONIC LOW BACK PAIN, UNSPECIFIED BACK PAIN LATERALITY, UNSPECIFIED WHETHER SCIATICA PRESENT: ICD-10-CM

## 2025-02-24 DIAGNOSIS — I86.1 VARICOCELE PRESENT ON ULTRASOUND OF SCROTUM: ICD-10-CM

## 2025-02-24 DIAGNOSIS — R79.89 ELEVATED TSH: ICD-10-CM

## 2025-02-24 DIAGNOSIS — Z00.00 WELLNESS EXAMINATION: ICD-10-CM

## 2025-02-24 DIAGNOSIS — F17.200 NICOTINE USE DISORDER: ICD-10-CM

## 2025-02-24 DIAGNOSIS — Z00.00 MEDICARE ANNUAL WELLNESS VISIT, SUBSEQUENT: ICD-10-CM

## 2025-02-24 DIAGNOSIS — Z12.5 SCREENING FOR PROSTATE CANCER: ICD-10-CM

## 2025-02-24 DIAGNOSIS — I10 HYPERTENSION, UNSPECIFIED TYPE: Primary | ICD-10-CM

## 2025-02-24 DIAGNOSIS — R22.1 NECK MASS: ICD-10-CM

## 2025-02-24 DIAGNOSIS — M10.9 GOUT, UNSPECIFIED CAUSE, UNSPECIFIED CHRONICITY, UNSPECIFIED SITE: ICD-10-CM

## 2025-02-24 DIAGNOSIS — E78.5 HYPERLIPIDEMIA, UNSPECIFIED HYPERLIPIDEMIA TYPE: ICD-10-CM

## 2025-02-24 DIAGNOSIS — C61 PROSTATE CANCER (MULTI): ICD-10-CM

## 2025-02-24 DIAGNOSIS — M54.50 CHRONIC LOW BACK PAIN, UNSPECIFIED BACK PAIN LATERALITY, UNSPECIFIED WHETHER SCIATICA PRESENT: ICD-10-CM

## 2025-02-24 DIAGNOSIS — E55.9 VITAMIN D DEFICIENCY: ICD-10-CM

## 2025-02-24 PROBLEM — C64.9: Status: RESOLVED | Noted: 2024-08-21 | Resolved: 2025-02-24

## 2025-02-24 PROCEDURE — 76536 US EXAM OF HEAD AND NECK: CPT

## 2025-02-24 PROCEDURE — 1160F RVW MEDS BY RX/DR IN RCRD: CPT | Performed by: STUDENT IN AN ORGANIZED HEALTH CARE EDUCATION/TRAINING PROGRAM

## 2025-02-24 PROCEDURE — 76536 US EXAM OF HEAD AND NECK: CPT | Performed by: RADIOLOGY

## 2025-02-24 PROCEDURE — 3074F SYST BP LT 130 MM HG: CPT | Performed by: STUDENT IN AN ORGANIZED HEALTH CARE EDUCATION/TRAINING PROGRAM

## 2025-02-24 PROCEDURE — 99214 OFFICE O/P EST MOD 30 MIN: CPT | Performed by: STUDENT IN AN ORGANIZED HEALTH CARE EDUCATION/TRAINING PROGRAM

## 2025-02-24 PROCEDURE — 1170F FXNL STATUS ASSESSED: CPT | Performed by: STUDENT IN AN ORGANIZED HEALTH CARE EDUCATION/TRAINING PROGRAM

## 2025-02-24 PROCEDURE — 99397 PER PM REEVAL EST PAT 65+ YR: CPT | Performed by: STUDENT IN AN ORGANIZED HEALTH CARE EDUCATION/TRAINING PROGRAM

## 2025-02-24 PROCEDURE — 1159F MED LIST DOCD IN RCRD: CPT | Performed by: STUDENT IN AN ORGANIZED HEALTH CARE EDUCATION/TRAINING PROGRAM

## 2025-02-24 PROCEDURE — 3079F DIAST BP 80-89 MM HG: CPT | Performed by: STUDENT IN AN ORGANIZED HEALTH CARE EDUCATION/TRAINING PROGRAM

## 2025-02-24 PROCEDURE — 1124F ACP DISCUSS-NO DSCNMKR DOCD: CPT | Performed by: STUDENT IN AN ORGANIZED HEALTH CARE EDUCATION/TRAINING PROGRAM

## 2025-02-24 PROCEDURE — G2211 COMPLEX E/M VISIT ADD ON: HCPCS | Performed by: STUDENT IN AN ORGANIZED HEALTH CARE EDUCATION/TRAINING PROGRAM

## 2025-02-24 PROCEDURE — G0439 PPPS, SUBSEQ VISIT: HCPCS | Performed by: STUDENT IN AN ORGANIZED HEALTH CARE EDUCATION/TRAINING PROGRAM

## 2025-02-24 PROCEDURE — 3008F BODY MASS INDEX DOCD: CPT | Performed by: STUDENT IN AN ORGANIZED HEALTH CARE EDUCATION/TRAINING PROGRAM

## 2025-02-24 RX ORDER — ALLOPURINOL 100 MG/1
100 TABLET ORAL DAILY
Qty: 30 TABLET | Refills: 11 | Status: SHIPPED | OUTPATIENT
Start: 2025-02-24 | End: 2026-02-24

## 2025-02-24 RX ORDER — ATORVASTATIN CALCIUM 40 MG/1
40 TABLET, FILM COATED ORAL DAILY
Qty: 90 TABLET | Refills: 1 | Status: SHIPPED | OUTPATIENT
Start: 2025-02-24

## 2025-02-24 RX ORDER — CYCLOBENZAPRINE HCL 10 MG
10 TABLET ORAL NIGHTLY PRN
Qty: 30 TABLET | Refills: 2 | Status: SHIPPED | OUTPATIENT
Start: 2025-02-24 | End: 2025-10-22

## 2025-02-24 RX ORDER — COLCHICINE 0.6 MG/1
0.6 TABLET ORAL DAILY
Qty: 90 TABLET | Refills: 1 | Status: SHIPPED | OUTPATIENT
Start: 2025-02-24 | End: 2025-08-23

## 2025-02-24 ASSESSMENT — ACTIVITIES OF DAILY LIVING (ADL)
DRESSING: INDEPENDENT
DOING_HOUSEWORK: NEEDS ASSISTANCE
BATHING: INDEPENDENT
TAKING_MEDICATION: INDEPENDENT
MANAGING_FINANCES: INDEPENDENT
GROCERY_SHOPPING: NEEDS ASSISTANCE

## 2025-02-24 ASSESSMENT — PATIENT HEALTH QUESTIONNAIRE - PHQ9
2. FEELING DOWN, DEPRESSED OR HOPELESS: NOT AT ALL
1. LITTLE INTEREST OR PLEASURE IN DOING THINGS: NOT AT ALL
SUM OF ALL RESPONSES TO PHQ9 QUESTIONS 1 AND 2: 0

## 2025-02-24 ASSESSMENT — ENCOUNTER SYMPTOMS
OCCASIONAL FEELINGS OF UNSTEADINESS: 1
DEPRESSION: 0
LOSS OF SENSATION IN FEET: 0

## 2025-02-24 NOTE — ASSESSMENT & PLAN NOTE
Orders:    CBC; Future    Lipid Panel; Future    Comprehensive Metabolic Panel; Future    TSH with reflex to Free T4 if abnormal; Future    colchicine 0.6 mg tablet; Take 1 tablet (0.6 mg) by mouth once daily.    allopurinol (Zyloprim) 100 mg tablet; Take 1 tablet (100 mg) by mouth once daily.    Uric acid; Future

## 2025-02-24 NOTE — PROGRESS NOTES
"Subjective   Reason for Visit: Dinesh Weiss Jr. is an 69 y.o. male here for a Medicare Wellness visit.          Reviewed all medications by prescribing practitioner or clinical pharmacist (such as prescriptions, OTCs, herbal therapies and supplements) and documented in the medical record.    HPI    Patient Care Team:  Christopher D'Amico, DO as PCP - General     Review of Systems    Objective   Vitals:  /80   Pulse 78   Ht 1.702 m (5' 7\")   Wt 122 kg (268 lb)   SpO2 94%   BMI 41.97 kg/m²       Physical Exam    Assessment & Plan  Hypertension, unspecified type    Orders:    CBC; Future    Lipid Panel; Future    Comprehensive Metabolic Panel; Future    TSH with reflex to Free T4 if abnormal; Future    Hyperlipidemia, unspecified hyperlipidemia type    Orders:    CBC; Future    Lipid Panel; Future    Comprehensive Metabolic Panel; Future    TSH with reflex to Free T4 if abnormal; Future    atorvastatin (Lipitor) 40 mg tablet; Take 1 tablet (40 mg) by mouth once daily.    Prostate cancer (Multi)    Orders:    CBC; Future    Lipid Panel; Future    Comprehensive Metabolic Panel; Future    TSH with reflex to Free T4 if abnormal; Future    Varicocele present on ultrasound of scrotum    Orders:    CBC; Future    Lipid Panel; Future    Comprehensive Metabolic Panel; Future    TSH with reflex to Free T4 if abnormal; Future    Gout, unspecified cause, unspecified chronicity, unspecified site    Orders:    CBC; Future    Lipid Panel; Future    Comprehensive Metabolic Panel; Future    TSH with reflex to Free T4 if abnormal; Future    colchicine 0.6 mg tablet; Take 1 tablet (0.6 mg) by mouth once daily.    allopurinol (Zyloprim) 100 mg tablet; Take 1 tablet (100 mg) by mouth once daily.    Uric acid; Future    Elevated TSH    Orders:    CBC; Future    Lipid Panel; Future    Comprehensive Metabolic Panel; Future    TSH with reflex to Free T4 if abnormal; Future    Medicare annual wellness visit, subsequent    Orders:   "  CBC; Future    Lipid Panel; Future    Comprehensive Metabolic Panel; Future    TSH with reflex to Free T4 if abnormal; Future    Wellness examination    Orders:    CBC; Future    Lipid Panel; Future    Comprehensive Metabolic Panel; Future    TSH with reflex to Free T4 if abnormal; Future    Vitamin D deficiency    Orders:    CBC; Future    Lipid Panel; Future    Comprehensive Metabolic Panel; Future    TSH with reflex to Free T4 if abnormal; Future    Vitamin D 25-Hydroxy,Total (for eval of Vitamin D levels); Future    Screening for prostate cancer    Orders:    CBC; Future    Lipid Panel; Future    Comprehensive Metabolic Panel; Future    TSH with reflex to Free T4 if abnormal; Future    Nicotine use disorder    Orders:    CBC; Future    Lipid Panel; Future    Comprehensive Metabolic Panel; Future    TSH with reflex to Free T4 if abnormal; Future    CT lung screening low dose; Future    Body mass index (BMI) 40.0-44.9, adult (Multi)         Nicotine dependence, cigarettes, in remission    Orders:    CT lung screening low dose; Future    Neck mass    Orders:    US neck; Future    Chronic low back pain, unspecified back pain laterality, unspecified whether sciatica present    Orders:    cyclobenzaprine (Flexeril) 10 mg tablet; Take 1 tablet (10 mg) by mouth as needed at bedtime for muscle spasms.                69-year-old male presenting for follow-up on multiple concerns, Medicare annual wellness exam/CPE.    HTN  Stable, not taking any medications currently.  Not checking blood pressure at home.     HLD  Stable, tolerating current regimen well.     Prostate cancer, varicocele  Stable, follows with urology     Gout  Continuing to have periodic flares.  Is willing to take maintenance medication.    Elevated TSH  Saw endocrinology previously.  Labs have normalized recently.    Chronic low back pain with radicular symptoms  Did well with muscle relaxer in the past, not entirely sure which 1.    Neck mass  Painful,  has been going on for several days.  Feels better with eating.    SocHx:   - Smoking: Daily smoker, approximately 50 years    12 point ROS reviewed and negative other than as stated in HPI      General: Alert, oriented, pleasant, in no acute distress  HEENT:      Head: normocephalic, atraumatic;      eyes: EOMI, no scleral icterus;   Neck: soft, supple, tender approximately 7 cm x 4 cm tender nodule on the left side anterolateral superior  CV: Heart with regular rate and rhythm, normal S1/S2, no murmurs  Lungs: CTAB without wheezing, rhonchi or rales; good respiratory effort, no increased work of breathing  Abdomen: soft, non-tender, non-distended, no masses appreciated, limited by body habitus  Extremities: Trace edema, no cyanosis  Neuro: Cranial nerves grossly intact; alert and oriented  Psych: Appropriate mood and affect    #HM  -CBC, CMP, Lipid panel, Vit D, TSH with reflex T4  -Vaccines:       Flu: Recommended, declined      Shingrix: Second shot recommended, advised to go to local pharmacy      Pneumococcal: UTD      Tdap: UTD 2017  -Lung cancer screening with low-dose CT: 51-pack-year history, ordered  -Colonoscopy: 2023, 3-5 year plan  -AAA screening: Negative 2023    #HTN  - At goal in office without medication  - Continue to recommend home monitoring     # HLD  - Continue atorvastatin 40 mg daily  - Repeat lipid panel     #Prostate cancer #Varicocele of testicle  - Follows with urology     # Gout  - Continue colchicine as needed, going to start allopurinol 100 mg daily, should take colchicine daily as prophylaxis  - Uric acid     #Elevated TSH   - Repeat TSH  - Established with endocrinology    #Low back pain with bilateral radiculopathy  - Rx cyclobenzaprine 10 mg at bedtime as needed    #Neck mass  - lymph node versus obstructed salivary gland  - Sour candies  - Ultrasound for better characterization    F/U 3 months, sooner if indicated    Chris D'Amico, DO

## 2025-02-24 NOTE — ASSESSMENT & PLAN NOTE
Orders:    CBC; Future    Lipid Panel; Future    Comprehensive Metabolic Panel; Future    TSH with reflex to Free T4 if abnormal; Future    atorvastatin (Lipitor) 40 mg tablet; Take 1 tablet (40 mg) by mouth once daily.

## 2025-02-25 DIAGNOSIS — K11.5 SIALOLITHIASIS: Primary | ICD-10-CM

## 2025-02-25 DIAGNOSIS — M10.9 GOUT, UNSPECIFIED CAUSE, UNSPECIFIED CHRONICITY, UNSPECIFIED SITE: Primary | ICD-10-CM

## 2025-02-25 LAB
25(OH)D3+25(OH)D2 SERPL-MCNC: 11 NG/ML (ref 30–100)
ALBUMIN SERPL-MCNC: 4.5 G/DL (ref 3.6–5.1)
ALP SERPL-CCNC: 80 U/L (ref 35–144)
ALT SERPL-CCNC: 28 U/L (ref 9–46)
ANION GAP SERPL CALCULATED.4IONS-SCNC: 8 MMOL/L (CALC) (ref 7–17)
AST SERPL-CCNC: 20 U/L (ref 10–35)
BILIRUB SERPL-MCNC: 0.9 MG/DL (ref 0.2–1.2)
BUN SERPL-MCNC: 13 MG/DL (ref 7–25)
CALCIUM SERPL-MCNC: 9.4 MG/DL (ref 8.6–10.3)
CHLORIDE SERPL-SCNC: 107 MMOL/L (ref 98–110)
CHOLEST SERPL-MCNC: 162 MG/DL
CHOLEST/HDLC SERPL: 3.4 (CALC)
CO2 SERPL-SCNC: 29 MMOL/L (ref 20–32)
CREAT SERPL-MCNC: 1.27 MG/DL (ref 0.7–1.35)
EGFRCR SERPLBLD CKD-EPI 2021: 61 ML/MIN/1.73M2
ERYTHROCYTE [DISTWIDTH] IN BLOOD BY AUTOMATED COUNT: 13 % (ref 11–15)
GLUCOSE SERPL-MCNC: 90 MG/DL (ref 65–99)
HCT VFR BLD AUTO: 45.3 % (ref 38.5–50)
HDLC SERPL-MCNC: 47 MG/DL
HGB BLD-MCNC: 15.4 G/DL (ref 13.2–17.1)
LDLC SERPL CALC-MCNC: 96 MG/DL (CALC)
MCH RBC QN AUTO: 32 PG (ref 27–33)
MCHC RBC AUTO-ENTMCNC: 34 G/DL (ref 32–36)
MCV RBC AUTO: 94 FL (ref 80–100)
NONHDLC SERPL-MCNC: 115 MG/DL (CALC)
PLATELET # BLD AUTO: 170 THOUSAND/UL (ref 140–400)
PMV BLD REES-ECKER: 11.6 FL (ref 7.5–12.5)
POTASSIUM SERPL-SCNC: 3.9 MMOL/L (ref 3.5–5.3)
PROT SERPL-MCNC: 6.8 G/DL (ref 6.1–8.1)
RBC # BLD AUTO: 4.82 MILLION/UL (ref 4.2–5.8)
SODIUM SERPL-SCNC: 144 MMOL/L (ref 135–146)
TRIGL SERPL-MCNC: 92 MG/DL
TSH SERPL-ACNC: 0.94 MIU/L (ref 0.4–4.5)
URATE SERPL-MCNC: 8.8 MG/DL (ref 4–8)
WBC # BLD AUTO: 4.9 THOUSAND/UL (ref 3.8–10.8)

## 2025-02-26 ENCOUNTER — TELEPHONE (OUTPATIENT)
Dept: PRIMARY CARE | Facility: CLINIC | Age: 70
End: 2025-02-26
Payer: COMMERCIAL

## 2025-02-26 NOTE — TELEPHONE ENCOUNTER
----- Message from Christopher D'Amico sent at 2/25/2025  7:08 PM EST -----  Uric acid 8.8, start allopurinol as discussed in office.  Should get repeat lab in several weeks to ensure that levels are at goal.    Vitamin D moderately low at 11, recommend OTC vitamin D3, 5000 units daily.    Remaining labs unremarkable.

## 2025-02-26 NOTE — RESULT ENCOUNTER NOTE
Uric acid 8.8, start allopurinol as discussed in office.  Should get repeat lab in several weeks to ensure that levels are at goal.    Vitamin D moderately low at 11, recommend OTC vitamin D3, 5000 units daily.    Remaining labs unremarkable.

## 2025-02-26 NOTE — RESULT ENCOUNTER NOTE
Ultrasound shows a probable stone in the salivary gland, as we suspected in office.  They are recommending soft tissue neck CT for better evaluation.  Imaging ordered.

## 2025-02-26 NOTE — TELEPHONE ENCOUNTER
Result Communication    Resulted Orders   CBC   Result Value Ref Range    WHITE BLOOD CELL COUNT 4.9 3.8 - 10.8 Thousand/uL    RED BLOOD CELL COUNT 4.82 4.20 - 5.80 Million/uL    HEMOGLOBIN 15.4 13.2 - 17.1 g/dL    HEMATOCRIT 45.3 38.5 - 50.0 %    MCV 94.0 80.0 - 100.0 fL    MCH 32.0 27.0 - 33.0 pg    MCHC 34.0 32.0 - 36.0 g/dL      Comment:      For adults, a slight decrease in the calculated MCHC  value (in the range of 30 to 32 g/dL) is most likely  not clinically significant; however, it should be  interpreted with caution in correlation with other  red cell parameters and the patient's clinical  condition.      RDW 13.0 11.0 - 15.0 %    PLATELET COUNT 170 140 - 400 Thousand/uL    MPV 11.6 7.5 - 12.5 fL    Narrative    FASTING:YES    FASTING: YES   Lipid Panel   Result Value Ref Range    CHOLESTEROL, TOTAL 162 <200 mg/dL    HDL CHOLESTEROL 47 > OR = 40 mg/dL    TRIGLYCERIDES 92 <150 mg/dL    LDL-CHOLESTEROL 96 mg/dL (calc)      Comment:      Reference range: <100     Desirable range <100 mg/dL for primary prevention;    <70 mg/dL for patients with CHD or diabetic patients   with > or = 2 CHD risk factors.     LDL-C is now calculated using the Isidoro-Jaja   calculation, which is a validated novel method providing   better accuracy than the Friedewald equation in the   estimation of LDL-C.   Isidoro SS et al. MACIE. 2013;310(19): 6299-2058   (http://education.BuzzCity.Braintech/faq/FYX973)      CHOL/HDLC RATIO 3.4 <5.0 (calc)    NON HDL CHOLESTEROL 115 <130 mg/dL (calc)      Comment:      For patients with diabetes plus 1 major ASCVD risk   factor, treating to a non-HDL-C goal of <100 mg/dL   (LDL-C of <70 mg/dL) is considered a therapeutic   option.      Narrative    FASTING:YES    FASTING: YES   Comprehensive Metabolic Panel   Result Value Ref Range    GLUCOSE 90 65 - 99 mg/dL      Comment:                    Fasting reference interval         UREA NITROGEN (BUN) 13 7 - 25 mg/dL    CREATININE 1.27 0.70 - 1.35  mg/dL    EGFR 61 > OR = 60 mL/min/1.73m2    SODIUM 144 135 - 146 mmol/L    POTASSIUM 3.9 3.5 - 5.3 mmol/L    CHLORIDE 107 98 - 110 mmol/L    CARBON DIOXIDE 29 20 - 32 mmol/L    ELECTROLYTE BALANCE 8 7 - 17 mmol/L (calc)    CALCIUM 9.4 8.6 - 10.3 mg/dL    PROTEIN, TOTAL 6.8 6.1 - 8.1 g/dL    ALBUMIN 4.5 3.6 - 5.1 g/dL    BILIRUBIN, TOTAL 0.9 0.2 - 1.2 mg/dL    ALKALINE PHOSPHATASE 80 35 - 144 U/L    AST 20 10 - 35 U/L    ALT 28 9 - 46 U/L    Narrative    FASTING:YES    FASTING: YES   TSH with reflex to Free T4 if abnormal   Result Value Ref Range    TSH W/REFLEX TO FT4 0.94 0.40 - 4.50 mIU/L    Narrative    FASTING:YES    FASTING: YES   Vitamin D 25-Hydroxy,Total (for eval of Vitamin D levels)   Result Value Ref Range    VITAMIN D,25-OH,TOTAL,IA 11 (L) 30 - 100 ng/mL      Comment:      Vitamin D Status         25-OH Vitamin D:     Deficiency:                    <20 ng/mL  Insufficiency:             20 - 29 ng/mL  Optimal:                 > or = 30 ng/mL     For 25-OH Vitamin D testing on patients on   D2-supplementation and patients for whom quantitation   of D2 and D3 fractions is required, the QuestAssureD(TM)  25-OH VIT D, (D2,D3), LC/MS/MS is recommended: order   code 45305 (patients >2yrs).     See Note 1     Note 1     For additional information, please refer to   http://education.6Waves.OpenDoor/faq/ENH045   (This link is being provided for informational/  educational purposes only.)      Narrative    FASTING:YES    FASTING: YES   Uric acid   Result Value Ref Range    URIC ACID 8.8 (H) 4.0 - 8.0 mg/dL      Comment:      Therapeutic target for gout patients: <6.0 mg/dL          Narrative    FASTING:YES    FASTING: YES       5:05 PM      Results were not successfully communicated with the patient and they did not acknowledge their understanding.

## 2025-03-14 ENCOUNTER — HOSPITAL ENCOUNTER (OUTPATIENT)
Dept: RADIOLOGY | Facility: CLINIC | Age: 70
Discharge: HOME | End: 2025-03-14
Payer: COMMERCIAL

## 2025-03-14 DIAGNOSIS — F17.200 NICOTINE USE DISORDER: ICD-10-CM

## 2025-03-14 DIAGNOSIS — K11.5 SIALOLITHIASIS: ICD-10-CM

## 2025-03-14 DIAGNOSIS — F17.211 NICOTINE DEPENDENCE, CIGARETTES, IN REMISSION: ICD-10-CM

## 2025-03-14 PROCEDURE — 71271 CT THORAX LUNG CANCER SCR C-: CPT

## 2025-03-14 PROCEDURE — 70490 CT SOFT TISSUE NECK W/O DYE: CPT

## 2025-03-17 DIAGNOSIS — I71.21 ANEURYSM OF ASCENDING AORTA WITHOUT RUPTURE (CMS-HCC): Primary | ICD-10-CM

## 2025-03-17 DIAGNOSIS — K11.5 SIALOLITH: Primary | ICD-10-CM

## 2025-03-17 DIAGNOSIS — N28.1 RENAL CYST, LEFT: ICD-10-CM

## 2025-03-18 ENCOUNTER — TELEPHONE (OUTPATIENT)
Dept: PRIMARY CARE | Facility: CLINIC | Age: 70
End: 2025-03-18
Payer: COMMERCIAL

## 2025-03-18 NOTE — TELEPHONE ENCOUNTER
Result Communication    Resulted Orders   CT lung screening low dose    Narrative    Interpreted By:  Gerry Waldron,   STUDY:  CT LUNG SCREENING LOW DOSE; 3/14/2025 8:18 am      INDICATION:  Smoker screening      COMPARISON:  03/11/2024 and 03/09/2023      ACCESSION NUMBER(S):  [id number]      ORDERING CLINICIAN:  CHRISTOPHER D'AMICO      TECHNIQUE:  Helical data acquisition of the chest was obtained without IV  contrast material.  Images were reformatted in axial, coronal, and  sagittal planes.      FINDINGS:  LUNGS AND AIRWAYS:  Diminished lung volumes and respiratory motion. Mild generalized  airway thickening. Mild central airway retained secretions without  obstruction. Scant upper lung zone emphysematous changes. 5 mm and  smaller solid nodular densities within both lungs which are stable  dating back to the 03/09/2023 examination and are therefore  presumably benign, annotated on series 3. No new or enlarging nodule      MEDIASTINUM AND CHACORTA, LOWER NECK AND AXILLA:  No intrathoracic lymphadenopathy. The esophagus is not substantially  dilated. No masses are identified in the lower neck      HEART AND VESSELS:  Normal heart size. No pericardial effusion. Unchanged 4.2 cm mid  ascending thoracic aortic aneurysm, remeasured for consistency on the  prior. Normal caliber pulmonary trunk. Mild coronary artery  calcification.      UPPER ABDOMEN:  Liver attenuation is mildly diminished and difficult to directly  compare to recent prior examinations given the presence of more  pronounced beam hardening artifact on those exams. The finding is  compatible with steatosis. Partially included changes related to  prior partial right nephrectomy. Cholelithiasis. There appears to be  a partially exophytic left upper pole renal lesion measuring 2.3 cm  in diameter and averaging 47 Hounsfield units on this noncontrast  exam on series 4, image 312, not clearly identified on prior  abdominal CT 01/29/2020      CHEST WALL AND  OSSEOUS STRUCTURES:  No significant soft tissue findings. No lytic or blastic osseous  lesion is identified. Degenerative changes of the spine.        Impression    1. Benign small lung nodules. Advise ongoing annual low-dose lung  cancer screening CT  2. Unchanged 4.2 cm mid ascending thoracic aortic aneurysm. Advise  attention on ongoing follow-up. No recent echocardiogram has been  performed per chart review, which may be obtained as clinically  appropriate.  3. Prior partial right nephrectomy with partially included  postoperative changes. There appears to be a partially exophytic left  upper pole renal lesion measuring 2.3 cm and averaging 47 Hounsfield  units, not seen on prior abdominal CT 01/29/2020. This is nonspecific  and could be a complex cyst. A renal neoplasm is not excluded. Advise  correlation with a renal ultrasound versus renal protocol  cross-sectional imaging study of the abdomen (MRI if there is no  contraindication).      LUNG RADS CATEGORY:  Lung Rad: Lung-RADS 1, S (Negative)  Recommendation: Continue annual screening with Low Dose Chest CT in  12 months, recommended as per American College of Radiology  Guidelines Lung-RADS Version 2022. Lung-RADS S (Other non-nodular  findings) Management as appropriate to finding per American College  of Radiology Guidelines Lung-RADS Version 2022.      A yellow alert in epic was placed to ensure clinician receipt of this  abnormal result and recommendation for renal ultrasound or renal  protocol cross-sectional imaging of the abdomen to assess a left  upper pole renal lesion which appears to be new as compared to a  prior abdominal CT from 2020. This was entered by Dr. Waldron at 1:32  p.m. on 03/15/2025.          MACRO:  None      Signed by: Gerry Waldron 3/15/2025 1:32 PM  Dictation workstation:   [id number]       2:47 PM      Results were not successfully communicated with the patient and they did not acknowledge their understanding.

## 2025-03-18 NOTE — TELEPHONE ENCOUNTER
----- Message from Christopher D'Amico sent at 3/17/2025 12:52 PM EDT -----  Lung cancer screening shows benign small lung nodules, continue annual screening.    Unchanged ascending thoracic aortic aneurysm, advised to continue continue attention on ongoing follow-up.  No recent echocardiogram.  Will order for better characterization.    On the left kidney there is new lesion, they are recommending renal ultrasound for better characterization.  We will put an order.  Continue to follow with urology as well.    Please confirm patient aware of results

## 2025-03-18 NOTE — TELEPHONE ENCOUNTER
----- Message from Christopher D'Amico sent at 3/17/2025 11:24 AM EDT -----  CT showed multiple stones in the left mandibular gland duct.  No change in treatment, can see ENT for follow-up if persistent.  I will put in referral.

## 2025-03-18 NOTE — TELEPHONE ENCOUNTER
Result Communication    Resulted Orders   CT soft tissue neck wo IV contrast    Narrative    Interpreted By:  Nely Eli and Beyersdorf Conner   STUDY:  CT SOFT TISSUE NECK WO IV CONTRAST;  3/14/2025 8:18 am      INDICATION:  Signs/Symptoms:Sialolithiasis.      ,K11.5 Sialolithiasis      COMPARISON:  None.      ACCESSION NUMBER(S):  [id number]      ORDERING CLINICIAN:  CHRISTOPHER D'AMICO      TECHNIQUE:  Axial CT images of the neck were obtained. The images were  reformatted in angled axial, coronal and sagittal planes.      FINDINGS:  Oral Cavity, Pharynx and Larynx:  Evaluation oral cavity is limited  by streak artifact from dental hardware.  The nasopharyngeal and  oropharyngeal structures are unremarkable. The hypopharyngeal and  laryngeal structures are unremarkable.      Retropharyngeal and Prevertebral Soft Tissues: Unremarkable.      Lymph nodes: There are few non specific bilateral neck nodes,  probably reactive in etiology.      Neck vessels: Limited evaluation of the vessels of the neck given  lack of intravenous contrast. Small calcification of the left common  carotid artery.      Thyroid gland: The thyroid gland is unremarkable in size and  appearance.      Parotid and submandibular glands: There are sialoliths in the left  submandibular gland measuring 1.1 and is 0.8 cm (series 5, image 132  and image 133). Additional punctate sialoliths are seen more  inferiorly on image 138. No salivary duct dilation. No evidence of  the adjacent inflammatory changes. Bilateral parotid glands and right  submandibular gland are unremarkable.      Paranasal Sinuses and Mastoids: Mild right maxillary and right  anterior ethmoid mucosal thickening. Paranasal sinuses and mastoid  air cells are otherwise clear.      Visualized orbital structures are unremarkable.      Evaluation of the lung apices is degraded secondary to patient motion  and respiratory motion.      Diffuse degenerative changes of the cervical  spine.  Moderate-to-severe neural foraminal narrowing from C3-4 to C7-T1.  Degree of central canal stenosis is not well evaluated given streak  artifact of the lower cervical and upper thoracic spines.        Impression    1. Sialoliths in the left submandibular gland measuring up to 1.1 cm.  No significant ductal dilation or adjacent inflammatory changes are  evident, within limitations of this noncontrast exam.  2. Diffuse degenerative changes of the cervical spine with multilevel  moderate-to-severe neural foraminal narrowing. Evaluation of the  central canal is limited given streak artifact.          I personally reviewed the image(s)/study and resident interpretation.  I agree with the findings as stated by resident Yo Ortiz.  Data analyzed and images interpreted at [facility], [place], [place].      MACRO:  None      Signed by: Nely Eli 3/14/2025 1:51 PM  Dictation workstation:   [id number]       2:51 PM      Results were not successfully communicated with the patient and they did not acknowledge their understanding.

## 2025-03-19 ENCOUNTER — APPOINTMENT (OUTPATIENT)
Dept: RADIOLOGY | Facility: CLINIC | Age: 70
End: 2025-03-19
Payer: COMMERCIAL

## 2025-03-31 ENCOUNTER — APPOINTMENT (OUTPATIENT)
Dept: OTOLARYNGOLOGY | Facility: CLINIC | Age: 70
End: 2025-03-31
Payer: COMMERCIAL

## 2025-03-31 ENCOUNTER — TELEPHONE (OUTPATIENT)
Dept: PRIMARY CARE | Facility: CLINIC | Age: 70
End: 2025-03-31

## 2025-03-31 ENCOUNTER — HOSPITAL ENCOUNTER (OUTPATIENT)
Dept: RADIOLOGY | Facility: CLINIC | Age: 70
Discharge: HOME | End: 2025-03-31
Payer: COMMERCIAL

## 2025-03-31 VITALS — WEIGHT: 263 LBS | BODY MASS INDEX: 41.28 KG/M2 | HEIGHT: 67 IN

## 2025-03-31 DIAGNOSIS — N28.1 RENAL CYST, LEFT: ICD-10-CM

## 2025-03-31 DIAGNOSIS — K11.5 SIALOLITH: ICD-10-CM

## 2025-03-31 PROCEDURE — 76770 US EXAM ABDO BACK WALL COMP: CPT | Performed by: RADIOLOGY

## 2025-03-31 PROCEDURE — 76770 US EXAM ABDO BACK WALL COMP: CPT

## 2025-03-31 PROCEDURE — 99204 OFFICE O/P NEW MOD 45 MIN: CPT | Performed by: STUDENT IN AN ORGANIZED HEALTH CARE EDUCATION/TRAINING PROGRAM

## 2025-03-31 PROCEDURE — 1159F MED LIST DOCD IN RCRD: CPT | Performed by: STUDENT IN AN ORGANIZED HEALTH CARE EDUCATION/TRAINING PROGRAM

## 2025-03-31 PROCEDURE — 3008F BODY MASS INDEX DOCD: CPT | Performed by: STUDENT IN AN ORGANIZED HEALTH CARE EDUCATION/TRAINING PROGRAM

## 2025-03-31 NOTE — PATIENT INSTRUCTIONS
You have a left submandibular stone.  This can be treated with surgery to remove the submandibular gland (spit gland) and stone).    If you have swelling, treat with ibuprofen and tylenol, sour candies, and water.    Quit smoking

## 2025-03-31 NOTE — PROGRESS NOTES
"HEAD AND NECK SURGERY CONSULT  [facility]    Referring Provider: D'Amico HPI  I had the pleasure of seeing Dinesh Weiss JrVinh as a consultation today for evaluation of submandibular gland swelling and sialolith.     Patient reports an episode of left neck pain and swelling. He did not need any procedures or hospitalization. He did conservative measures (sour candies) and it resolved. This was several months ago, no recent episodes. Mild tenderness today. No intraoral masses, bleeding, weight loss, or trouble swallowing. Imaging showed large 1.1 cm sialolith at the hilum.    The patient denies having prior trauma or surgery to their head and neck. There is not a personal or family history of blood clots, easy bleeding or bruising, or anesthesia concerns. Presents with his wife today. Smoking 1-2 ppd cigarettes    Tobacco use: The patient  reports that he has been smoking cigarettes. He has never used smokeless tobacco.   Alcohol Use: The patient  reports current alcohol use.     Physical Examination  Vitals:  Ht 1.702 m (5' 7\")   Wt 119 kg (263 lb)   BMI 41.19 kg/m²   General: Examination reveals a well-developed, well-nourished patient in no apparent distress.  The patient has no audible dysphonia, stridor or airway distress.  The patient is oriented, alert and responsive.    Ears: Bilateral EAC patent, TM visualized and intact, no middle ear effusion   Face: no lesions of the face, symmetric movement  Oral Cavity:  The patient is able to open the mouth widely without trismus.  The floor of mouth and oral tongue are soft, and no mucosal abnormalities are noted on the lips or within the oral cavity.  The oral tongue is fully mobile and midline on protrusion.  Oropharynx: There are no mucosal abnormalities noted within the oropharynx.  The soft palate elevates symmetrically, the tonsils and base of tongue are normal to inspection and palpation.       Salivary glands: There are no palpable masses of " the parotid or submandibular glands. Mild tenderness on palpation of left submandibular gland  Neuro: Cranial nerves 2-12 are without obvious abnormality.  Neck: The neck is soft and symmetric.  There is no palpable adenopathy.     DATA REVIEWED:  Labs:  Lab Results   Component Value Date    WBC 4.9 02/24/2025    HGB 15.4 02/24/2025    HCT 45.3 02/24/2025     02/24/2025    NEUTROABS 2.59 09/14/2021     Lab Results   Component Value Date    TSH 0.94 02/24/2025    HGBA1C 5.4 09/14/2021        Radiology: I personally reviewed the CT neck 3/14/25 showing left submandibular gland sialoliths 1.1 cm and 0.8 cm without inflammatory changes     Review of prior medical records: I reviewed the patient's medical records which included a clinic note from Christopher D'Amico, DO (PCP) detailing painful neck mass for several days, imaging work up and ENT referral     ASSESSMENT and PLAN:    Sialolith left submandibular gland  - Discussed treatment at length. He is not very bothered by the left submandibular today. Reports one episode of pain and swelling. Reviewed that he may continue to have problems with the gland given the known sialolith  - Discussed conservative treatment including smoking cessation, hydration, sour candies, and tylenol/ibuprofen  - Discussed surgery at length. Given the size and location of the stone he would not be a candidate for sialoendoscopy. Recommend left submandibular gland excision for definitive treatment, this would be outpatient surgery. Risks and benefits reviewed including pain, bleeding, infection, scar, damage to surrounding structures including nerves, intraoral injury, risks of anesthesia, need for further procedures  - He would like to think about it, he will call if he has further issues    Stacey Arredondo MD

## 2025-03-31 NOTE — TELEPHONE ENCOUNTER
Result Communication    Resulted Orders   CT lung screening low dose    Narrative    Interpreted By:  Gerry Waldron,   STUDY:  CT LUNG SCREENING LOW DOSE; 3/14/2025 8:18 am      INDICATION:  Smoker screening      COMPARISON:  03/11/2024 and 03/09/2023      ACCESSION NUMBER(S):  [id number]      ORDERING CLINICIAN:  CHRISTOPHER D'AMICO      TECHNIQUE:  Helical data acquisition of the chest was obtained without IV  contrast material.  Images were reformatted in axial, coronal, and  sagittal planes.      FINDINGS:  LUNGS AND AIRWAYS:  Diminished lung volumes and respiratory motion. Mild generalized  airway thickening. Mild central airway retained secretions without  obstruction. Scant upper lung zone emphysematous changes. 5 mm and  smaller solid nodular densities within both lungs which are stable  dating back to the 03/09/2023 examination and are therefore  presumably benign, annotated on series 3. No new or enlarging nodule      MEDIASTINUM AND CHACORTA, LOWER NECK AND AXILLA:  No intrathoracic lymphadenopathy. The esophagus is not substantially  dilated. No masses are identified in the lower neck      HEART AND VESSELS:  Normal heart size. No pericardial effusion. Unchanged 4.2 cm mid  ascending thoracic aortic aneurysm, remeasured for consistency on the  prior. Normal caliber pulmonary trunk. Mild coronary artery  calcification.      UPPER ABDOMEN:  Liver attenuation is mildly diminished and difficult to directly  compare to recent prior examinations given the presence of more  pronounced beam hardening artifact on those exams. The finding is  compatible with steatosis. Partially included changes related to  prior partial right nephrectomy. Cholelithiasis. There appears to be  a partially exophytic left upper pole renal lesion measuring 2.3 cm  in diameter and averaging 47 Hounsfield units on this noncontrast  exam on series 4, image 312, not clearly identified on prior  abdominal CT 01/29/2020      CHEST WALL AND  OSSEOUS STRUCTURES:  No significant soft tissue findings. No lytic or blastic osseous  lesion is identified. Degenerative changes of the spine.        Impression    1. Benign small lung nodules. Advise ongoing annual low-dose lung  cancer screening CT  2. Unchanged 4.2 cm mid ascending thoracic aortic aneurysm. Advise  attention on ongoing follow-up. No recent echocardiogram has been  performed per chart review, which may be obtained as clinically  appropriate.  3. Prior partial right nephrectomy with partially included  postoperative changes. There appears to be a partially exophytic left  upper pole renal lesion measuring 2.3 cm and averaging 47 Hounsfield  units, not seen on prior abdominal CT 01/29/2020. This is nonspecific  and could be a complex cyst. A renal neoplasm is not excluded. Advise  correlation with a renal ultrasound versus renal protocol  cross-sectional imaging study of the abdomen (MRI if there is no  contraindication).      LUNG RADS CATEGORY:  Lung Rad: Lung-RADS 1, S (Negative)  Recommendation: Continue annual screening with Low Dose Chest CT in  12 months, recommended as per American College of Radiology  Guidelines Lung-RADS Version 2022. Lung-RADS S (Other non-nodular  findings) Management as appropriate to finding per American College  of Radiology Guidelines Lung-RADS Version 2022.      A yellow alert in epic was placed to ensure clinician receipt of this  abnormal result and recommendation for renal ultrasound or renal  protocol cross-sectional imaging of the abdomen to assess a left  upper pole renal lesion which appears to be new as compared to a  prior abdominal CT from 2020. This was entered by Dr. Waldron at 1:32  p.m. on 03/15/2025.          MACRO:  None      Signed by: Gerry Waldron 3/15/2025 1:32 PM  Dictation workstation:   [id number]       10:17 AM      Results were not successfully communicated with the patient and they did not acknowledge their understanding.  Third attempt  trying to reach pt; has not returned my phone call.

## 2025-03-31 NOTE — TELEPHONE ENCOUNTER
Result Communication    Resulted Orders   CT soft tissue neck wo IV contrast    Narrative    Interpreted By:  Nely Eli and Beyersdorf Conner   STUDY:  CT SOFT TISSUE NECK WO IV CONTRAST;  3/14/2025 8:18 am      INDICATION:  Signs/Symptoms:Sialolithiasis.      ,K11.5 Sialolithiasis      COMPARISON:  None.      ACCESSION NUMBER(S):  [id number]      ORDERING CLINICIAN:  CHRISTOPHER D'AMICO      TECHNIQUE:  Axial CT images of the neck were obtained. The images were  reformatted in angled axial, coronal and sagittal planes.      FINDINGS:  Oral Cavity, Pharynx and Larynx:  Evaluation oral cavity is limited  by streak artifact from dental hardware.  The nasopharyngeal and  oropharyngeal structures are unremarkable. The hypopharyngeal and  laryngeal structures are unremarkable.      Retropharyngeal and Prevertebral Soft Tissues: Unremarkable.      Lymph nodes: There are few non specific bilateral neck nodes,  probably reactive in etiology.      Neck vessels: Limited evaluation of the vessels of the neck given  lack of intravenous contrast. Small calcification of the left common  carotid artery.      Thyroid gland: The thyroid gland is unremarkable in size and  appearance.      Parotid and submandibular glands: There are sialoliths in the left  submandibular gland measuring 1.1 and is 0.8 cm (series 5, image 132  and image 133). Additional punctate sialoliths are seen more  inferiorly on image 138. No salivary duct dilation. No evidence of  the adjacent inflammatory changes. Bilateral parotid glands and right  submandibular gland are unremarkable.      Paranasal Sinuses and Mastoids: Mild right maxillary and right  anterior ethmoid mucosal thickening. Paranasal sinuses and mastoid  air cells are otherwise clear.      Visualized orbital structures are unremarkable.      Evaluation of the lung apices is degraded secondary to patient motion  and respiratory motion.      Diffuse degenerative changes of the cervical  spine.  Moderate-to-severe neural foraminal narrowing from C3-4 to C7-T1.  Degree of central canal stenosis is not well evaluated given streak  artifact of the lower cervical and upper thoracic spines.        Impression    1. Sialoliths in the left submandibular gland measuring up to 1.1 cm.  No significant ductal dilation or adjacent inflammatory changes are  evident, within limitations of this noncontrast exam.  2. Diffuse degenerative changes of the cervical spine with multilevel  moderate-to-severe neural foraminal narrowing. Evaluation of the  central canal is limited given streak artifact.          I personally reviewed the image(s)/study and resident interpretation.  I agree with the findings as stated by resident Yo Ortiz.  Data analyzed and images interpreted at [facility], [place], [place].      MACRO:  None      Signed by: Nely Eli 3/14/2025 1:51 PM  Dictation workstation:   [id number]       10:19 AM      Results were not successfully communicated with the patient and they did not acknowledge their understanding.  3rd attempt trying to reach pt; has not returned my phone call

## 2025-03-31 NOTE — LETTER
"March 31, 2025     [place]  [address] [address], [address]  [facility] [place] [facility] [address]    Patient: Dinesh Weiss Jr.   YOB: 1955   Date of Visit: 3/31/2025       Dear Dr. Christopher D'Amico, DO:    Thank you for referring Dinesh Weiss to me for evaluation. Below are my notes for this consultation.  If you have questions, please do not hesitate to call me. I look forward to following your patient along with you.       Sincerely,     Stacey Arredondo MD      CC: No Recipients  ______________________________________________________________________________________    HEAD AND NECK SURGERY CONSULT  [facility]    Referring Provider: D'Amico HPI  I had the pleasure of seeing Dinesh Weiss Jr. as a consultation today for evaluation of submandibular gland swelling and sialolith.     Patient reports an episode of left neck pain and swelling. He did not need any procedures or hospitalization. He did conservative measures (sour candies) and it resolved. This was several months ago, no recent episodes. Mild tenderness today. No intraoral masses, bleeding, weight loss, or trouble swallowing. Imaging showed large 1.1 cm sialolith at the hilum.    The patient denies having prior trauma or surgery to their head and neck. There is not a personal or family history of blood clots, easy bleeding or bruising, or anesthesia concerns. Presents with his wife today. Smoking 1-2 ppd cigarettes    Tobacco use: The patient  reports that he has been smoking cigarettes. He has never used smokeless tobacco.   Alcohol Use: The patient  reports current alcohol use.     Physical Examination  Vitals:  Ht 1.702 m (5' 7\")   Wt 119 kg (263 lb)   BMI 41.19 kg/m²   General: Examination reveals a well-developed, well-nourished patient in no apparent distress.  The patient has no audible dysphonia, stridor or airway distress.  The patient is oriented, alert and responsive.    Ears: " Bilateral EAC patent, TM visualized and intact, no middle ear effusion   Face: no lesions of the face, symmetric movement  Oral Cavity:  The patient is able to open the mouth widely without trismus.  The floor of mouth and oral tongue are soft, and no mucosal abnormalities are noted on the lips or within the oral cavity.  The oral tongue is fully mobile and midline on protrusion.  Oropharynx: There are no mucosal abnormalities noted within the oropharynx.  The soft palate elevates symmetrically, the tonsils and base of tongue are normal to inspection and palpation.       Salivary glands: There are no palpable masses of the parotid or submandibular glands. Mild tenderness on palpation of left submandibular gland  Neuro: Cranial nerves 2-12 are without obvious abnormality.  Neck: The neck is soft and symmetric.  There is no palpable adenopathy.     DATA REVIEWED:  Labs:  Lab Results   Component Value Date    WBC 4.9 02/24/2025    HGB 15.4 02/24/2025    HCT 45.3 02/24/2025     02/24/2025    NEUTROABS 2.59 09/14/2021     Lab Results   Component Value Date    TSH 0.94 02/24/2025    HGBA1C 5.4 09/14/2021        Radiology: I personally reviewed the CT neck 3/14/25 showing left submandibular gland sialoliths 1.1 cm and 0.8 cm without inflammatory changes     Review of prior medical records: I reviewed the patient's medical records which included a clinic note from Christopher D'Amico, DO (PCP) detailing painful neck mass for several days, imaging work up and ENT referral     ASSESSMENT and PLAN:    Sialolith left submandibular gland  - Discussed treatment at length. He is not very bothered by the left submandibular today. Reports one episode of pain and swelling. Reviewed that he may continue to have problems with the gland given the known sialolith  - Discussed conservative treatment including smoking cessation, hydration, sour candies, and tylenol/ibuprofen  - Discussed surgery at length. Given the size and  location of the stone he would not be a candidate for sialoendoscopy. Recommend left submandibular gland excision for definitive treatment, this would be outpatient surgery. Risks and benefits reviewed including pain, bleeding, infection, scar, damage to surrounding structures including nerves, intraoral injury, risks of anesthesia, need for further procedures  - He would like to think about it, he will call if he has further issues    Stacey Arredondo MD

## 2025-04-01 ENCOUNTER — ANCILLARY PROCEDURE (OUTPATIENT)
Dept: CARDIOLOGY | Facility: CLINIC | Age: 70
End: 2025-04-01
Payer: COMMERCIAL

## 2025-04-01 DIAGNOSIS — I71.21 ANEURYSM OF ASCENDING AORTA WITHOUT RUPTURE: ICD-10-CM

## 2025-04-01 NOTE — RESULT ENCOUNTER NOTE
Renal ultrasound showed 1 cm hypoechoic structure, too small to be characterized properly, but statistically most likely represents a cyst.  No other findings on ultrasound.

## 2025-04-09 ENCOUNTER — TELEPHONE (OUTPATIENT)
Dept: PRIMARY CARE | Facility: CLINIC | Age: 70
End: 2025-04-09

## 2025-04-09 ENCOUNTER — ANCILLARY PROCEDURE (OUTPATIENT)
Dept: CARDIOLOGY | Facility: CLINIC | Age: 70
End: 2025-04-09
Payer: COMMERCIAL

## 2025-04-09 LAB
AORTIC VALVE MEAN GRADIENT: 3 MMHG
AORTIC VALVE PEAK VELOCITY: 1.28 M/S
AV PEAK GRADIENT: 7 MMHG
AVA (PEAK VEL): 3.23 CM2
AVA (VTI): 3.32 CM2
EJECTION FRACTION APICAL 4 CHAMBER: 69.3
EJECTION FRACTION: 63 %
LEFT ATRIUM VOLUME AREA LENGTH INDEX BSA: 20.6 ML/M2
LEFT VENTRICLE INTERNAL DIMENSION DIASTOLE: 4.24 CM (ref 3.5–6)
LEFT VENTRICULAR OUTFLOW TRACT DIAMETER: 2.17 CM
MITRAL VALVE E/A RATIO: 0.69
RIGHT VENTRICLE FREE WALL PEAK S': 11 CM/S
RIGHT VENTRICLE PEAK SYSTOLIC PRESSURE: 21.9 MMHG
TRICUSPID ANNULAR PLANE SYSTOLIC EXCURSION: 1.6 CM

## 2025-04-09 PROCEDURE — 2500000004 HC RX 250 GENERAL PHARMACY W/ HCPCS (ALT 636 FOR OP/ED): Performed by: INTERNAL MEDICINE

## 2025-04-09 PROCEDURE — C8929 TTE W OR WO FOL WCON,DOPPLER: HCPCS

## 2025-04-09 PROCEDURE — 93306 TTE W/DOPPLER COMPLETE: CPT | Performed by: INTERNAL MEDICINE

## 2025-04-09 RX ADMIN — PERFLUTREN 2 ML OF DILUTION: 6.52 INJECTION, SUSPENSION INTRAVENOUS at 08:51

## 2025-04-09 NOTE — TELEPHONE ENCOUNTER
----- Message from Christopher D'Amico sent at 4/9/2025 11:39 AM EDT -----  Echocardiogram shows stable mild dilation of aorta.  No other concerning findings.

## 2025-04-09 NOTE — TELEPHONE ENCOUNTER
Result Communication    Resulted Orders   Transthoracic Echo (TTE) Complete   Result Value Ref Range    AV pk jair 1.28 m/s    AV mn grad 3 mmHg    LVOT diam 2.17 cm    MV E/A ratio 0.69     LA vol index A/L 20.6 ml/m2    Tricuspid annular plane systolic excursion 1.6 cm    LV EF 63 %    RV free wall pk S' 11.00 cm/s    LVIDd 4.24 cm    RVSP 21.9 mmHg    Aortic Valve Area by Continuity of VTI 3.32 cm2    Aortic Valve Area by Continuity of Peak Velocity 3.23 cm2    AV pk grad 7 mmHg    LV A4C EF 69.3     Narrative            [facility], [address], [place] [address], Tel                                   [phone]    TRANSTHORACIC ECHOCARDIOGRAM REPORT       Patient Name:       RAE Avalos Physician:    [id number] Law Leon DO  Study Date:         4/9/2025            Ordering Provider:    [id number] CHRISTOPHER D'AMICO  MRN/PID:            [id number]            Fellow:  Accession#:         [id number]        Nurse:  Date of Birth/Age:  1955 / 69      Sonographer:          Catia Munoz RDCS                      years  Gender assigned at  M                   Additional Staff:  Birth:  Height:             170.18 cm           Admit Date:  Weight:             119.29 kg           Admission Status:     Outpatient  BSA / BMI:          2.27 m2 / 41.19     Encounter#:           [id number]                      kg/m2  Blood Pressure:     128/80 mmHg         Department Location:  [facility]    Study Type:    TRANSTHORACIC ECHO (TTE) COMPLETE  Diagnosis/ICD: Aneurysm of the ascending aorta, without rupture-I71.21  Indication:    Ascending Aortic Aneurysm  CPT Code:      Echo Complete w Full Doppler-[id number]    Patient History:  Pertinent History: HTN and Hyperlipidemia. Prostate Cancer.    Study Detail: The following Echo studies were performed: 2D, M-Mode, Doppler  and                color flow. Technically challenging study due to poor acoustic                windows and body habitus. Definity used as a contrast agent for                endocardial border definition. Total contrast used for this                procedure was 3 mL via IV push.       PHYSICIAN INTERPRETATION:  Left Ventricle: Left ventricular ejection fraction is normal, by visual estimate at 60-65%. There are no regional left ventricular wall motion abnormalities. The left ventricular cavity size is normal. There is normal septal and mildly increased posterior left ventricular wall thickness. There is left ventricular concentric remodeling. Spectral Doppler shows a normal pattern of left ventricular diastolic filling.  Left Atrium: The left atrial size is normal.  Right Ventricle: The right ventricle is normal in size. There is normal right ventricular global systolic function.  Right Atrium: The right atrium is normal in size.  Aortic Valve: The aortic valve is trileaflet. The aortic valve dimensionless index is 0.90. There is no evidence of aortic valve regurgitation. The peak instantaneous gradient of the aortic valve is 7 mmHg. The mean gradient of the aortic valve is 3 mmHg.  Mitral Valve: The mitral valve is normal in structure. There is no evidence of mitral valve regurgitation.  Tricuspid Valve: The tricuspid valve is structurally normal. No evidence of tricuspid regurgitation.  Pulmonic Valve: The pulmonic valve is structurally normal. There is no indication of pulmonic valve regurgitation.  Pericardium: There is no pericardial effusion noted.  Aorta: The aortic root is abnormal. Ascending aorta mildly enlarged 4.0 cm.  In comparison to the previous echocardiogram(s): There are no prior studies on this patient for comparison purposes.       CONCLUSIONS:   1. Left ventricular ejection fraction is normal, by visual estimate at 60-65%.   2. Ascending aorta mildly enlarged 4.0 cm.    QUANTITATIVE DATA  SUMMARY:     2D MEASUREMENTS:          Normal Ranges:  LAs:             3.30 cm  (2.7-4.0cm)  IVSd:            1.04 cm  (0.6-1.1cm)  LVPWd:           1.06 cm  (0.6-1.1cm)  LVIDd:           4.24 cm  (3.9-5.9cm)  LVIDs:           2.88 cm  LV Mass Index:   79 g/m2  LVEDV Index:     46 ml/m2  LV % FS          32.1 %       LEFT ATRIUM:                  Normal Ranges:  LA Vol A4C:        47.2 ml    (22+/-6mL/m2)  LA Vol A2C:        40.8 ml  LA Vol BP:         46.9 ml  LA Vol Index A4C:  20.8ml/m2  LA Vol Index A2C:  18.0 ml/m2  LA Vol Index BP:   20.6 ml/m2  LA Area A4C:       17.8 cm2  LA Area A2C:       15.5 cm2  LA Major Axis A4C: 5.7 cm  LA Major Axis A2C: 5.0 cm  LA Volume Index:   20.6 ml/m2       AORTA MEASUREMENTS:         Normal Ranges:  Ao Sinus, d:        4.00 cm (2.1-3.5cm)  Ao STJ, d:          3.20 cm (1.7-3.4cm)  Asc Ao, d:          3.80 cm (2.1-3.4cm)       LV SYSTOLIC FUNCTION:                       Normal Ranges:  EF-A4C View:    69 % (>=55%)  EF-A2C View:    69 %  EF-Biplane:     70 %  EF-Visual:      63 %  LV EF Reported: 63 %       LV DIASTOLIC FUNCTION:             Normal Ranges:  MV Peak E:             0.78 m/s    (0.7-1.2 m/s)  MV Peak A:             1.13 m/s    (0.42-0.7 m/s)  E/A Ratio:             0.69        (1.0-2.2)  MV e'                  0.060 m/s   (>8.0)  MV lateral e'          0.05 m/s  MV medial e'           0.07 m/s  MV A Dur:              123.69 msec  E/e' Ratio:            13.05       (<8.0)  a'                     0.10 m/s  PulmV Sys Eddie:         25.42 cm/s  PulmV Gonzales Eddie:        24.93 cm/s  PulmV S/D Eddie:         1.02  PulmV A Revs Eddie:      19.93 cm/s  PulmV A Revs Dur:      89.44 msec       MITRAL VALVE:          Normal Ranges:  MV DT:        240 msec (150-240msec)       AORTIC VALVE:                     Normal Ranges:  AoV Vmax:                1.28 m/s (<=1.7m/s)  AoV Peak P.5 mmHg (<20mmHg)  AoV Mean PG:             3.4 mmHg (1.7-11.5mmHg)  LVOT Max Eddie:             1.12 m/s (<=1.1m/s)  AoV VTI:                 24.31 cm (18-25cm)  LVOT VTI:                21.88 cm  LVOT Diameter:           2.17 cm  (1.8-2.4cm)  AoV Area, VTI:           3.32 cm2 (2.5-5.5cm2)  AoV Area,Vmax:           3.23 cm2 (2.5-4.5cm2)  AoV Dimensionless Index: 0.90       RIGHT VENTRICLE:  TAPSE: 16.0 mm  RV s'  0.11 m/s       TRICUSPID VALVE/RVSP:          Normal Ranges:  Peak TR Velocity:     2.17 m/s  Est. RA Pressure:     3 mmHg  RV Syst Pressure:     22 mmHg  (< 30mmHg)       PULMONIC VALVE:          Normal Ranges:  PV Accel Time:  63 msec  (>120ms)  PV Max Eddie:     0.8 m/s  (0.6-0.9m/s)  PV Max P.3 mmHg       PULMONARY VEINS:  PulmV A Revs Dur: 89.44 msec  PulmV A Revs Eddie: 19.93 cm/s  PulmV Gonzales Eddie:   24.93 cm/s  PulmV S/D Eddie:    1.02  PulmV Sys Eddie:    25.42 cm/s       AORTA:  Asc Ao Diam 3.69 cm       [id number] Law Leon DO  Electronically signed on 2025 at 9:21:49 AM         ** Final **         1:50 PM      Results were not successfully communicated with the patient and they did not acknowledge their understanding.

## 2025-04-28 ENCOUNTER — APPOINTMENT (OUTPATIENT)
Dept: RADIOLOGY | Facility: CLINIC | Age: 70
End: 2025-04-28
Payer: COMMERCIAL

## 2025-05-06 ENCOUNTER — APPOINTMENT (OUTPATIENT)
Dept: ORTHOPEDIC SURGERY | Facility: CLINIC | Age: 70
End: 2025-05-06
Payer: COMMERCIAL

## 2025-05-12 ENCOUNTER — OFFICE VISIT (OUTPATIENT)
Dept: ORTHOPEDIC SURGERY | Facility: CLINIC | Age: 70
End: 2025-05-12
Payer: COMMERCIAL

## 2025-05-12 ENCOUNTER — HOSPITAL ENCOUNTER (OUTPATIENT)
Dept: RADIOLOGY | Facility: CLINIC | Age: 70
Discharge: HOME | End: 2025-05-12
Payer: COMMERCIAL

## 2025-05-12 VITALS — WEIGHT: 265 LBS | BODY MASS INDEX: 41.5 KG/M2

## 2025-05-12 DIAGNOSIS — M17.11 PRIMARY OSTEOARTHRITIS OF RIGHT KNEE: Primary | ICD-10-CM

## 2025-05-12 DIAGNOSIS — M25.561 CHRONIC PAIN OF RIGHT KNEE: ICD-10-CM

## 2025-05-12 DIAGNOSIS — M25.561 RIGHT KNEE PAIN, UNSPECIFIED CHRONICITY: ICD-10-CM

## 2025-05-12 DIAGNOSIS — G89.29 CHRONIC PAIN OF RIGHT KNEE: ICD-10-CM

## 2025-05-12 PROCEDURE — 2500000004 HC RX 250 GENERAL PHARMACY W/ HCPCS (ALT 636 FOR OP/ED): Performed by: ORTHOPAEDIC SURGERY

## 2025-05-12 PROCEDURE — 1159F MED LIST DOCD IN RCRD: CPT | Performed by: ORTHOPAEDIC SURGERY

## 2025-05-12 PROCEDURE — 99204 OFFICE O/P NEW MOD 45 MIN: CPT | Performed by: ORTHOPAEDIC SURGERY

## 2025-05-12 PROCEDURE — 73562 X-RAY EXAM OF KNEE 3: CPT | Mod: RT

## 2025-05-12 PROCEDURE — 20610 DRAIN/INJ JOINT/BURSA W/O US: CPT | Mod: RT | Performed by: ORTHOPAEDIC SURGERY

## 2025-05-12 PROCEDURE — 99214 OFFICE O/P EST MOD 30 MIN: CPT | Mod: 25 | Performed by: ORTHOPAEDIC SURGERY

## 2025-05-12 PROCEDURE — 73562 X-RAY EXAM OF KNEE 3: CPT | Mod: RIGHT SIDE | Performed by: RADIOLOGY

## 2025-05-12 RX ORDER — LIDOCAINE HYDROCHLORIDE 20 MG/ML
2 INJECTION, SOLUTION INFILTRATION; PERINEURAL
Status: COMPLETED | OUTPATIENT
Start: 2025-05-12 | End: 2025-05-12

## 2025-05-12 RX ORDER — DICLOFENAC SODIUM 10 MG/G
4 GEL TOPICAL 4 TIMES DAILY PRN
Qty: 4 G | Refills: 3 | Status: SHIPPED | OUTPATIENT
Start: 2025-05-12 | End: 2025-06-11

## 2025-05-12 RX ORDER — METHYLPREDNISOLONE ACETATE 40 MG/ML
40 INJECTION, SUSPENSION INTRA-ARTICULAR; INTRALESIONAL; INTRAMUSCULAR; SOFT TISSUE
Status: COMPLETED | OUTPATIENT
Start: 2025-05-12 | End: 2025-05-12

## 2025-05-12 RX ADMIN — METHYLPREDNISOLONE ACETATE 40 MG: 40 INJECTION, SUSPENSION INTRALESIONAL; INTRAMUSCULAR; INTRASYNOVIAL; SOFT TISSUE at 20:28

## 2025-05-12 RX ADMIN — LIDOCAINE HYDROCHLORIDE 2 ML: 20 INJECTION, SOLUTION INFILTRATION; PERINEURAL at 20:28

## 2025-05-12 NOTE — PROGRESS NOTES
PRIMARY CARE PHYSICIAN: Christopher D'Amico, DO  REFERRING PROVIDER: No referring provider defined for this encounter.     CONSULT ORTHOPAEDIC: Knee Evaluation    ASSESSMENT & PLAN    Impression/Plan: 69 year old male presenting with moderate right knee osteoarthritis. At this point, we will continue with conservative management. He has had prior relief with CSI and it has been two years since his last injection. Patient received a CSI into his right knee at today's visit; he tolerated the injection well. We also discussed the importance of smoking cessation.     - Follow up as needed for R knee CSI; no sooner than three months   - Continue to use topical diclofenac   - Continue with conservative management of right knee OA  - Continue to work on smoking cessation and weight loss     L Inj/Asp: R knee on 5/12/2025 8:28 PM  Indications: pain  Details: 21 G needle, anteromedial approach  Medications: 40 mg methylPREDNISolone acetate 40 mg/mL; 2 mL lidocaine 20 mg/mL (2 %)          SUBJECTIVE  CHIEF COMPLAINT:   Chief Complaint   Patient presents with    Right Knee - Pain        HPI: Dinesh Weiss Jr. is a 69 y.o. patient presenting with right knee pain.  Patient has had right knee pain over the last several years.  He is currently using topical diclofenac cream with moderate relief.  He previously has undergone corticosteroid injections with adequate pain relief.  His last injection was over 2 years ago.  He has previously undergone bilateral hip replacement surgery which she denies any complications following.  His knee pain is currently impacting his quality of life.  He struggles to walk down the driveway without pain at this time.      REVIEW OF SYSTEMS  Constitutional: See HPI for pain assessment, No significant weight loss, recent trauma  Cardiovascular: No chest pain, shortness of breath  Respiratory: No difficulty breathing, cough  Gastrointestinal: No nausea, vomiting, diarrhea,  constipation  Musculoskeletal: Noted in HPI, positive for pain, restricted motion, stiffness  Integumentary: No rashes, easy bruising, redness   Neurological: no numbness or tingling in extremities, no gait disturbances   Psychiatric: No mood changes, memory changes, social issues  Heme/Lymph: no excessive swelling, easy bruising, excessive bleeding  ENT: No hearing changes  Eyes: No vision changes    Medical History[1]     Allergies[2]     Surgical History[3]     Family History[4]     Social History     Socioeconomic History    Marital status:      Spouse name: Not on file    Number of children: Not on file    Years of education: Not on file    Highest education level: Not on file   Occupational History    Not on file   Tobacco Use    Smoking status: Every Day     Current packs/day: 0.50     Types: Cigarettes    Smokeless tobacco: Never   Vaping Use    Vaping status: Never Used   Substance and Sexual Activity    Alcohol use: Yes     Comment: now and then (holidays)    Drug use: Never    Sexual activity: Not on file   Other Topics Concern    Not on file   Social History Narrative    Not on file     Social Drivers of Health     Financial Resource Strain: High Risk (8/2/2024)    Received from [facility] SDOH Screening     In the past year, have you been unable to get any of the following when you really needed them? choose all that apply.: Clothing   Food Insecurity: High Risk (8/2/2024)    Received from [facility] SDOH Screening     In the past 2 months, did you or others you live with eat smaller meals or skip meals because you didn't have money for food?: Yes   Transportation Needs: Not on file   Physical Activity: Not on file   Stress: Not on file   Social Connections: Not on file   Intimate Partner Violence: Not on file   Housing Stability: Not on file        CURRENT MEDICATIONS:   Current Medications[5]     OBJECTIVE    PHYSICAL EXAM      10/11/2023     9:09 AM 2/20/2024     8:55  "AM 8/21/2024     9:15 AM 9/27/2024     1:20 PM 2/24/2025     9:47 AM 3/31/2025     8:20 AM 5/12/2025    12:52 PM   Vitals   Systolic 149 138 125  128     Diastolic 83 76 79  80     Heart Rate 78 88 83  78     Temp    36.1 °C (96.9 °F)      Height 1.651 m (5' 5\") 1.651 m (5' 5\") 1.651 m (5' 5\") 1.702 m (5' 7\") 1.702 m (5' 7\") 1.702 m (5' 7\")    Weight (lb) 264 260 265 270 268 263 265   BMI 43.93 kg/m2 43.27 kg/m2 44.1 kg/m2 42.29 kg/m2 41.97 kg/m2 41.19 kg/m2 41.5 kg/m2   BSA (m2) 2.35 m2 2.33 m2 2.35 m2 2.4 m2 2.4 m2 2.37 m2 2.38 m2   Visit Report Report Report Report Report Report Report Report      Body mass index is 41.5 kg/m².    GENERAL: A/Ox3, NAD. Appears healthy, well nourished  PSYCHIATRIC: Mood stable, appropriate memory recall  EYES: EOM intact, no scleral icterus  CARDIAC: regular rate  LUNGS: Breathing non-labored  SKIN: no erythema, rashes, or ecchymoses     MUSCULOSKELETAL:  Laterality: right Knee Exam  This is a well-appearing patient in no acute distress.  There is mild effusion to the knee.  There is tenderness to palpation along the medial joint line, no tenderness to palpation along lateral joint line.  Range of motion: 0 to 120 degrees without pain.  There is moderate pain with manipulation of patella.  Negative Lachman's exam.  The knee is stable to posterior stress.  The knee is stable to varus and valgus stress at both 0 and 30 degrees knee flexion.  5/5 Strength TA, EHL, GS    NEUROVASCULAR:  - Neurovascular Status: sensation intact to light touch distally, lower extremity motor intact  - Capillary refill brisk at extremities, Bilateral dorsalis pedis pulse 2+    Imaging: Multiple views of the affected right knee(s) demonstrate: moderate right knee osteoarthritis notably in the medial and patellofemoral compartments.   X-rays were personally reviewed and interpreted by me.  Radiology reports were reviewed by me as well, if readily available at the time.                               [1] "   Past Medical History:  Diagnosis Date    Encounter for preprocedural cardiovascular examination 06/13/2013    Pre-operative cardiovascular examination    Male erectile dysfunction, unspecified 08/13/2020    Organic impotence    Osteoarthritis of hip, unspecified 06/13/2013    Osteoarthritis of hip    Other cervical disc degeneration, unspecified cervical region 06/13/2013    Degeneration of cervical intervertebral disc    Other conditions influencing health status 06/13/2013    Shoulder Joint Disorder    Other conditions influencing health status 06/13/2013    Difficulty Breathing (Dyspnea)    Other specified soft tissue disorders 06/13/2013    Limb swelling    Personal history of other diseases of the musculoskeletal system and connective tissue 08/13/2020    History of backache    Personal history of other diseases of the nervous system and sense organs     History of cataract    Personal history of other malignant neoplasm of kidney 04/15/2021    History of other malignant neoplasm of kidney    Personal history of other specified conditions 06/13/2013    History of headache    Spondylosis without myelopathy or radiculopathy, cervical region 06/13/2013    Cervical spondylosis   [2]   Allergies  Allergen Reactions    Iodinated Contrast Media Rash   [3]   Past Surgical History:  Procedure Laterality Date    OTHER SURGICAL HISTORY  08/13/2020    Partial Nephrectomy    TOTAL HIP ARTHROPLASTY  08/13/2020    Hip Replacement   [4]   Family History  Problem Relation Name Age of Onset    No Known Problems Mother      No Known Problems Father     [5]   Current Outpatient Medications   Medication Sig Dispense Refill    allopurinol (Zyloprim) 100 mg tablet Take 1 tablet (100 mg) by mouth once daily. 30 tablet 11    aspirin 81 mg EC tablet Take 1 tablet (81 mg) by mouth once daily.      atorvastatin (Lipitor) 40 mg tablet Take 1 tablet (40 mg) by mouth once daily. 90 tablet 1    colchicine 0.6 mg tablet Take 1 tablet (0.6  mg) by mouth once daily. 90 tablet 1    cyclobenzaprine (Flexeril) 10 mg tablet Take 1 tablet (10 mg) by mouth as needed at bedtime for muscle spasms. 30 tablet 2    diaper,brief,adult,disposable misc 1 each once daily. 30 each 11    Myrbetriq 50 mg tablet extended release 24 hr 24 hr tablet Take 1 tablet (50 mg) by mouth once daily. 90 tablet 3    tadalafil (Cialis) 20 mg tablet Take 1 tablet (20 mg) by mouth once daily as needed for erectile dysfunction. 10 tablet 0     No current facility-administered medications for this visit.

## 2025-05-19 ENCOUNTER — APPOINTMENT (OUTPATIENT)
Dept: PRIMARY CARE | Facility: CLINIC | Age: 70
End: 2025-05-19
Payer: COMMERCIAL

## 2025-05-19 VITALS
SYSTOLIC BLOOD PRESSURE: 130 MMHG | BODY MASS INDEX: 41.28 KG/M2 | DIASTOLIC BLOOD PRESSURE: 76 MMHG | HEART RATE: 85 BPM | HEIGHT: 67 IN | OXYGEN SATURATION: 95 % | WEIGHT: 263 LBS

## 2025-05-19 DIAGNOSIS — E78.5 HYPERLIPIDEMIA, UNSPECIFIED HYPERLIPIDEMIA TYPE: ICD-10-CM

## 2025-05-19 DIAGNOSIS — B96.89 BACTERIAL CONJUNCTIVITIS OF BOTH EYES: ICD-10-CM

## 2025-05-19 DIAGNOSIS — C61 PROSTATE CANCER (MULTI): ICD-10-CM

## 2025-05-19 DIAGNOSIS — I86.1 VARICOCELE PRESENT ON ULTRASOUND OF SCROTUM: ICD-10-CM

## 2025-05-19 DIAGNOSIS — I10 HYPERTENSION, UNSPECIFIED TYPE: ICD-10-CM

## 2025-05-19 DIAGNOSIS — R79.89 ELEVATED TSH: ICD-10-CM

## 2025-05-19 DIAGNOSIS — M54.50 CHRONIC LOW BACK PAIN, UNSPECIFIED BACK PAIN LATERALITY, UNSPECIFIED WHETHER SCIATICA PRESENT: ICD-10-CM

## 2025-05-19 DIAGNOSIS — G89.29 CHRONIC LOW BACK PAIN, UNSPECIFIED BACK PAIN LATERALITY, UNSPECIFIED WHETHER SCIATICA PRESENT: ICD-10-CM

## 2025-05-19 DIAGNOSIS — H10.9 BACTERIAL CONJUNCTIVITIS OF BOTH EYES: ICD-10-CM

## 2025-05-19 DIAGNOSIS — M10.9 GOUT, UNSPECIFIED CAUSE, UNSPECIFIED CHRONICITY, UNSPECIFIED SITE: Primary | ICD-10-CM

## 2025-05-19 PROCEDURE — 1160F RVW MEDS BY RX/DR IN RCRD: CPT | Performed by: STUDENT IN AN ORGANIZED HEALTH CARE EDUCATION/TRAINING PROGRAM

## 2025-05-19 PROCEDURE — 3075F SYST BP GE 130 - 139MM HG: CPT | Performed by: STUDENT IN AN ORGANIZED HEALTH CARE EDUCATION/TRAINING PROGRAM

## 2025-05-19 PROCEDURE — 1159F MED LIST DOCD IN RCRD: CPT | Performed by: STUDENT IN AN ORGANIZED HEALTH CARE EDUCATION/TRAINING PROGRAM

## 2025-05-19 PROCEDURE — 3078F DIAST BP <80 MM HG: CPT | Performed by: STUDENT IN AN ORGANIZED HEALTH CARE EDUCATION/TRAINING PROGRAM

## 2025-05-19 PROCEDURE — 3008F BODY MASS INDEX DOCD: CPT | Performed by: STUDENT IN AN ORGANIZED HEALTH CARE EDUCATION/TRAINING PROGRAM

## 2025-05-19 PROCEDURE — 99214 OFFICE O/P EST MOD 30 MIN: CPT | Performed by: STUDENT IN AN ORGANIZED HEALTH CARE EDUCATION/TRAINING PROGRAM

## 2025-05-19 PROCEDURE — G2211 COMPLEX E/M VISIT ADD ON: HCPCS | Performed by: STUDENT IN AN ORGANIZED HEALTH CARE EDUCATION/TRAINING PROGRAM

## 2025-05-19 RX ORDER — ALLOPURINOL 100 MG/1
100 TABLET ORAL DAILY
Qty: 30 TABLET | Refills: 11 | Status: SHIPPED | OUTPATIENT
Start: 2025-05-19 | End: 2026-05-19

## 2025-05-19 RX ORDER — POLYMYXIN B SULFATE AND TRIMETHOPRIM 1; 10000 MG/ML; [USP'U]/ML
1 SOLUTION OPHTHALMIC
Qty: 10 ML | Refills: 0 | Status: SHIPPED | OUTPATIENT
Start: 2025-05-19 | End: 2025-05-26

## 2025-05-19 ASSESSMENT — PATIENT HEALTH QUESTIONNAIRE - PHQ9
SUM OF ALL RESPONSES TO PHQ9 QUESTIONS 1 AND 2: 0
1. LITTLE INTEREST OR PLEASURE IN DOING THINGS: NOT AT ALL
SUM OF ALL RESPONSES TO PHQ9 QUESTIONS 1 AND 2: 0
2. FEELING DOWN, DEPRESSED OR HOPELESS: NOT AT ALL
1. LITTLE INTEREST OR PLEASURE IN DOING THINGS: NOT AT ALL
2. FEELING DOWN, DEPRESSED OR HOPELESS: NOT AT ALL

## 2025-05-19 ASSESSMENT — COLUMBIA-SUICIDE SEVERITY RATING SCALE - C-SSRS
1. IN THE PAST MONTH, HAVE YOU WISHED YOU WERE DEAD OR WISHED YOU COULD GO TO SLEEP AND NOT WAKE UP?: NO
6. HAVE YOU EVER DONE ANYTHING, STARTED TO DO ANYTHING, OR PREPARED TO DO ANYTHING TO END YOUR LIFE?: NO
1. IN THE PAST MONTH, HAVE YOU WISHED YOU WERE DEAD OR WISHED YOU COULD GO TO SLEEP AND NOT WAKE UP?: NO
2. HAVE YOU ACTUALLY HAD ANY THOUGHTS OF KILLING YOURSELF?: NO
2. HAVE YOU ACTUALLY HAD ANY THOUGHTS OF KILLING YOURSELF?: NO
6. HAVE YOU EVER DONE ANYTHING, STARTED TO DO ANYTHING, OR PREPARED TO DO ANYTHING TO END YOUR LIFE?: NO

## 2025-05-19 ASSESSMENT — ENCOUNTER SYMPTOMS
OCCASIONAL FEELINGS OF UNSTEADINESS: 1
LOSS OF SENSATION IN FEET: 0
DEPRESSION: 0

## 2025-05-19 NOTE — PROGRESS NOTES
69-year-old male presenting for follow-up on multiple concerns.    HTN  Stable, not taking any medications currently.  Not checking blood pressure at home.     HLD  Stable, tolerating current regimen well.     Prostate cancer, varicocele  Stable, follows with urology     Gout  Seems to be taking allopurinol, though there is some unreliable history taking.  Reports no significant flares in the interval.    Elevated TSH  Saw endocrinology previously.  Labs have normalized recently.    Chronic low back pain with radicular symptoms  Doing well at night with cyclobenzaprine.    Chronic knee pain  Following with orthopedics, status post injection.  Stable.    Sialolith of left submandibular gland    SocHx:   - Smoking: Daily smoker, approximately 50 years    12 point ROS reviewed and negative other than as stated in HPI      General: Alert, oriented, pleasant, in no acute distress  HEENT:      Head: normocephalic, atraumatic;      eyes: EOMI, no scleral icterus;   CV: Heart with regular rate and rhythm, normal S1/S2, no murmurs  Lungs: CTAB without wheezing, rhonchi or rales; good respiratory effort, no increased work of breathing  Neuro: Cranial nerves grossly intact; alert and oriented  Psych: Appropriate mood and affect    #HTN  - At goal in office without medication  - Continue to recommend home monitoring     # HLD  - Continue atorvastatin 40 mg daily     #Prostate cancer #Varicocele of testicle  - Follows with urology     # Gout  - Reportedly taking allopurinol 100 mg daily, reduced gout symptoms in the interval  - Repeat uric acid     #Elevated TSH   - Repeat TSH  - Established with endocrinology previously    #Low back pain with bilateral radiculopathy  - Continue cyclobenzaprine 10 mg at bedtime as needed    #Chronic knee pain  -Continue with orthopedics    #Sialolith of left submandibular gland  - Following with ENT    #Kidney cyst  -Ultrasound showed likely benign cyst, but too small to characterize  -  Discussed with patient, will get repeat imaging in 6-12 months, patient prefers 12 months    F/U 3 months, sooner if indicated, Medicare in February    Chris D'Amico, DO

## 2025-05-20 ENCOUNTER — TELEPHONE (OUTPATIENT)
Dept: PRIMARY CARE | Facility: CLINIC | Age: 70
End: 2025-05-20
Payer: COMMERCIAL

## 2025-05-20 DIAGNOSIS — E78.5 HYPERLIPIDEMIA, UNSPECIFIED HYPERLIPIDEMIA TYPE: ICD-10-CM

## 2025-05-20 LAB
ANION GAP SERPL CALCULATED.4IONS-SCNC: 8 MMOL/L (CALC) (ref 7–17)
BUN SERPL-MCNC: 11 MG/DL (ref 7–25)
BUN/CREAT SERPL: NORMAL (CALC) (ref 6–22)
CALCIUM SERPL-MCNC: 9.3 MG/DL (ref 8.6–10.3)
CHLORIDE SERPL-SCNC: 104 MMOL/L (ref 98–110)
CO2 SERPL-SCNC: 29 MMOL/L (ref 20–32)
CREAT SERPL-MCNC: 1.25 MG/DL (ref 0.7–1.35)
EGFRCR SERPLBLD CKD-EPI 2021: 62 ML/MIN/1.73M2
GLUCOSE SERPL-MCNC: 81 MG/DL (ref 65–99)
POTASSIUM SERPL-SCNC: 4.9 MMOL/L (ref 3.5–5.3)
SODIUM SERPL-SCNC: 141 MMOL/L (ref 135–146)
URATE SERPL-MCNC: 8 MG/DL (ref 4–8)

## 2025-05-20 NOTE — TELEPHONE ENCOUNTER
----- Message from Christopher D'Amico sent at 5/20/2025 10:31 AM EDT -----  Uric acid at 8, therapeutic target is for below 6, can increase allopurinol to 200 mg daily.  If he is having no gout attacks, maybe this is adequate treatment, but the recommended uric acid level is   6 or below.  If needing higher dosing, we can send when ready for refill.    Basic metabolic panel within normal limits  ----- Message -----  From: [facility] In  Sent: 5/20/2025   4:41 AM EDT  To: Christopher D'Amico,

## 2025-05-20 NOTE — RESULT ENCOUNTER NOTE
Uric acid at 8, therapeutic target is for below 6, can increase allopurinol to 200 mg daily.  If he is having no gout attacks, maybe this is adequate treatment, but the recommended uric acid level is 6 or below.  If needing higher dosing, we can send when ready for refill.    Basic metabolic panel within normal limits

## 2025-05-20 NOTE — TELEPHONE ENCOUNTER
Result Communication    Resulted Orders   Basic metabolic panel   Result Value Ref Range    GLUCOSE 81 65 - 99 mg/dL      Comment:                    Fasting reference interval         UREA NITROGEN (BUN) 11 7 - 25 mg/dL    CREATININE 1.25 0.70 - 1.35 mg/dL    EGFR 62 > OR = 60 mL/min/1.73m2    BUN/CREATININE RATIO SEE NOTE: 6 - 22 (calc)      Comment:         Not Reported: BUN and Creatinine are within     reference range.            SODIUM 141 135 - 146 mmol/L    POTASSIUM 4.9 3.5 - 5.3 mmol/L    CHLORIDE 104 98 - 110 mmol/L    CARBON DIOXIDE 29 20 - 32 mmol/L    ELECTROLYTE BALANCE 8 7 - 17 mmol/L (calc)    CALCIUM 9.3 8.6 - 10.3 mg/dL    Narrative    FASTING:YES    FASTING: YES   Uric acid   Result Value Ref Range    URIC ACID 8.0 4.0 - 8.0 mg/dL      Comment:      Therapeutic target for gout patients: <6.0 mg/dL          Narrative    FASTING:YES    FASTING: YES       1:48 PM      Results were not successfully communicated with the patient and they did not acknowledge their understanding.

## 2025-05-21 RX ORDER — ATORVASTATIN CALCIUM 40 MG/1
40 TABLET, FILM COATED ORAL DAILY
Qty: 90 TABLET | Refills: 1 | Status: SHIPPED | OUTPATIENT
Start: 2025-05-21

## 2025-08-13 DIAGNOSIS — E78.5 HYPERLIPIDEMIA, UNSPECIFIED HYPERLIPIDEMIA TYPE: ICD-10-CM

## 2025-08-13 RX ORDER — ATORVASTATIN CALCIUM 40 MG/1
40 TABLET, FILM COATED ORAL DAILY
Qty: 90 TABLET | Refills: 1 | Status: SHIPPED | OUTPATIENT
Start: 2025-08-13

## 2025-09-02 ENCOUNTER — APPOINTMENT (OUTPATIENT)
Dept: OPHTHALMOLOGY | Facility: CLINIC | Age: 70
End: 2025-09-02
Payer: COMMERCIAL

## 2025-09-02 PROBLEM — H25.13 AGE-RELATED NUCLEAR CATARACT OF BOTH EYES: Status: ACTIVE | Noted: 2023-05-12

## 2025-09-02 PROBLEM — Z86.69 HISTORY OF CATARACT: Status: RESOLVED | Noted: 2024-08-21 | Resolved: 2025-09-02

## 2025-09-02 PROBLEM — Z97.3 WEARS READING EYEGLASSES: Status: RESOLVED | Noted: 2023-05-12 | Resolved: 2025-09-02

## 2025-09-02 PROBLEM — Z97.3 WEARS EYEGLASSES: Status: RESOLVED | Noted: 2024-08-21 | Resolved: 2025-09-02

## 2025-09-02 PROBLEM — H52.03 HYPEROPIA WITH PRESBYOPIA OF BOTH EYES: Status: ACTIVE | Noted: 2025-09-02

## 2025-09-02 PROBLEM — H52.4 HYPEROPIA WITH PRESBYOPIA OF BOTH EYES: Status: ACTIVE | Noted: 2025-09-02

## 2025-09-02 PROBLEM — H35.373 EPIRETINAL MEMBRANE, BOTH EYES: Status: ACTIVE | Noted: 2025-09-02

## 2025-09-02 ASSESSMENT — ENCOUNTER SYMPTOMS
EYES NEGATIVE: 0
HEMATOLOGIC/LYMPHATIC NEGATIVE: 0
NEUROLOGICAL NEGATIVE: 0
ENDOCRINE NEGATIVE: 0
MUSCULOSKELETAL NEGATIVE: 0
PSYCHIATRIC NEGATIVE: 0
CARDIOVASCULAR NEGATIVE: 0
CONSTITUTIONAL NEGATIVE: 0
ALLERGIC/IMMUNOLOGIC NEGATIVE: 0
GASTROINTESTINAL NEGATIVE: 0
RESPIRATORY NEGATIVE: 0

## 2025-09-02 ASSESSMENT — REFRACTION_MANIFEST
OS_AXIS: 007
OD_ADD: +2.50
OS_CYLINDER: +0.75
OD_CYLINDER: +0.50
OD_SPHERE: +0.75
OS_CYLINDER: +0.50
OS_SPHERE: +0.50
OS_ADD: +2.50
OD_CYLINDER: +0.50
OD_AXIS: 149
OD_AXIS: 149
OS_SPHERE: +0.50
METHOD_AUTOREFRACTION: 1
OS_AXIS: 007
OD_SPHERE: +0.50

## 2025-09-02 ASSESSMENT — TONOMETRY
OS_IOP_MMHG: 19
OD_IOP_MMHG: 19
IOP_METHOD: TONOPEN

## 2025-09-02 ASSESSMENT — CONF VISUAL FIELD
OS_NORMAL: 1
OS_INFERIOR_NASAL_RESTRICTION: 0
OD_SUPERIOR_TEMPORAL_RESTRICTION: 0
OS_SUPERIOR_NASAL_RESTRICTION: 0
OD_INFERIOR_NASAL_RESTRICTION: 0
OD_SUPERIOR_NASAL_RESTRICTION: 0
OS_SUPERIOR_TEMPORAL_RESTRICTION: 0
OD_INFERIOR_TEMPORAL_RESTRICTION: 0
OS_INFERIOR_TEMPORAL_RESTRICTION: 0
OD_NORMAL: 1

## 2025-09-02 ASSESSMENT — VISUAL ACUITY
OS_SC: 20/20
OD_SC: 20/20-1
METHOD: SNELLEN - LINEAR

## 2025-09-02 ASSESSMENT — EXTERNAL EXAM - LEFT EYE: OS_EXAM: NORMAL

## 2025-09-02 ASSESSMENT — SLIT LAMP EXAM - LIDS
COMMENTS: GOOD POSITION
COMMENTS: GOOD POSITION

## 2025-09-02 ASSESSMENT — EXTERNAL EXAM - RIGHT EYE: OD_EXAM: NORMAL

## 2025-09-02 ASSESSMENT — CUP TO DISC RATIO
OD_RATIO: .6
OS_RATIO: 0.6

## 2025-09-08 ENCOUNTER — APPOINTMENT (OUTPATIENT)
Dept: ORTHOPEDIC SURGERY | Facility: CLINIC | Age: 70
End: 2025-09-08
Payer: COMMERCIAL

## 2025-09-15 ENCOUNTER — APPOINTMENT (OUTPATIENT)
Dept: PRIMARY CARE | Facility: CLINIC | Age: 70
End: 2025-09-15
Payer: COMMERCIAL